# Patient Record
Sex: FEMALE | Race: WHITE | NOT HISPANIC OR LATINO | Employment: OTHER | URBAN - METROPOLITAN AREA
[De-identification: names, ages, dates, MRNs, and addresses within clinical notes are randomized per-mention and may not be internally consistent; named-entity substitution may affect disease eponyms.]

---

## 2017-03-01 ENCOUNTER — HOSPITAL ENCOUNTER (OUTPATIENT)
Dept: PAIN MEDICINE | Facility: HOSPITAL | Age: 77
Discharge: HOME | End: 2017-03-01
Attending: PHYSICAL MEDICINE & REHABILITATION | Admitting: PHYSICAL MEDICINE & REHABILITATION

## 2017-04-19 ENCOUNTER — HOSPITAL ENCOUNTER (OUTPATIENT)
Dept: PAIN MEDICINE | Facility: HOSPITAL | Age: 77
Discharge: HOME | End: 2017-04-19
Attending: PHYSICAL MEDICINE & REHABILITATION | Admitting: PHYSICAL MEDICINE & REHABILITATION

## 2017-06-21 ENCOUNTER — HOSPITAL ENCOUNTER (OUTPATIENT)
Dept: PAIN MEDICINE | Facility: HOSPITAL | Age: 77
Discharge: HOME | End: 2017-06-21
Attending: PHYSICAL MEDICINE & REHABILITATION | Admitting: PHYSICAL MEDICINE & REHABILITATION

## 2017-06-27 ENCOUNTER — HOSPITAL ENCOUNTER (OUTPATIENT)
Dept: NUCLEAR MEDICINE | Facility: HOSPITAL | Age: 77
Discharge: HOME | End: 2017-06-27
Attending: INTERNAL MEDICINE | Admitting: INTERNAL MEDICINE

## 2017-08-16 ENCOUNTER — HOSPITAL ENCOUNTER (OUTPATIENT)
Dept: PAIN MEDICINE | Facility: HOSPITAL | Age: 77
Discharge: HOME | End: 2017-08-16
Attending: PHYSICAL MEDICINE & REHABILITATION | Admitting: PHYSICAL MEDICINE & REHABILITATION

## 2017-10-18 ENCOUNTER — HOSPITAL ENCOUNTER (OUTPATIENT)
Dept: PAIN MEDICINE | Facility: HOSPITAL | Age: 77
Discharge: HOME | End: 2017-10-18
Attending: PHYSICAL MEDICINE & REHABILITATION | Admitting: PHYSICAL MEDICINE & REHABILITATION

## 2017-12-20 ENCOUNTER — HOSPITAL ENCOUNTER (OUTPATIENT)
Dept: PAIN MEDICINE | Facility: HOSPITAL | Age: 77
Discharge: HOME | End: 2017-12-20
Attending: PHYSICAL MEDICINE & REHABILITATION | Admitting: PHYSICAL MEDICINE & REHABILITATION

## 2017-12-20 ENCOUNTER — HOSPITAL ENCOUNTER (OUTPATIENT)
Dept: OTHER | Facility: HOSPITAL | Age: 77
Discharge: HOME | End: 2017-12-20
Attending: PHYSICAL MEDICINE & REHABILITATION | Admitting: PHYSICAL MEDICINE & REHABILITATION

## 2018-01-02 ENCOUNTER — OFFICE (AMBULATORY)
Dept: URBAN - METROPOLITAN AREA CLINIC 64 | Facility: CLINIC | Age: 78
End: 2018-01-02

## 2018-01-02 VITALS
SYSTOLIC BLOOD PRESSURE: 156 MMHG | DIASTOLIC BLOOD PRESSURE: 80 MMHG | WEIGHT: 193 LBS | HEIGHT: 64 IN | HEART RATE: 54 BPM

## 2018-01-02 DIAGNOSIS — R13.10 DYSPHAGIA, UNSPECIFIED: ICD-10-CM

## 2018-01-02 DIAGNOSIS — K59.00 CONSTIPATION, UNSPECIFIED: ICD-10-CM

## 2018-01-02 LAB
CBC WITH DIFFERENTIAL/PLATELET: BASO (ABSOLUTE): 0 X10E3/UL (ref 0–0.2)
CBC WITH DIFFERENTIAL/PLATELET: BASOS: 0 %
CBC WITH DIFFERENTIAL/PLATELET: EOS (ABSOLUTE): 0.1 X10E3/UL (ref 0–0.4)
CBC WITH DIFFERENTIAL/PLATELET: EOS: 2 %
CBC WITH DIFFERENTIAL/PLATELET: HEMATOCRIT: 44 % (ref 34–46.6)
CBC WITH DIFFERENTIAL/PLATELET: HEMATOLOGY COMMENTS: (no result)
CBC WITH DIFFERENTIAL/PLATELET: HEMOGLOBIN: 14.9 G/DL (ref 11.1–15.9)
CBC WITH DIFFERENTIAL/PLATELET: IMMATURE CELLS: (no result)
CBC WITH DIFFERENTIAL/PLATELET: IMMATURE GRANS (ABS): 0 X10E3/UL (ref 0–0.1)
CBC WITH DIFFERENTIAL/PLATELET: IMMATURE GRANULOCYTES: 0 %
CBC WITH DIFFERENTIAL/PLATELET: LYMPHS (ABSOLUTE): 2.2 X10E3/UL (ref 0.7–3.1)
CBC WITH DIFFERENTIAL/PLATELET: LYMPHS: 34 %
CBC WITH DIFFERENTIAL/PLATELET: MCH: 32.2 PG (ref 26.6–33)
CBC WITH DIFFERENTIAL/PLATELET: MCHC: 33.9 G/DL (ref 31.5–35.7)
CBC WITH DIFFERENTIAL/PLATELET: MCV: 95 FL (ref 79–97)
CBC WITH DIFFERENTIAL/PLATELET: MONOCYTES(ABSOLUTE): 0.4 X10E3/UL (ref 0.1–0.9)
CBC WITH DIFFERENTIAL/PLATELET: MONOCYTES: 7 %
CBC WITH DIFFERENTIAL/PLATELET: NEUTROPHILS (ABSOLUTE): 3.6 X10E3/UL (ref 1.4–7)
CBC WITH DIFFERENTIAL/PLATELET: NEUTROPHILS: 57 %
CBC WITH DIFFERENTIAL/PLATELET: NRBC: (no result)
CBC WITH DIFFERENTIAL/PLATELET: PLATELETS: 253 X10E3/UL (ref 150–379)
CBC WITH DIFFERENTIAL/PLATELET: RBC: 4.63 X10E6/UL (ref 3.77–5.28)
CBC WITH DIFFERENTIAL/PLATELET: RDW: 13.2 % (ref 12.3–15.4)
CBC WITH DIFFERENTIAL/PLATELET: WBC: 6.4 X10E3/UL (ref 3.4–10.8)

## 2018-01-02 PROCEDURE — 99213 OFFICE O/P EST LOW 20 MIN: CPT | Performed by: INTERNAL MEDICINE

## 2018-01-30 ENCOUNTER — ON CAMPUS - OUTPATIENT (AMBULATORY)
Dept: URBAN - METROPOLITAN AREA HOSPITAL 2 | Facility: HOSPITAL | Age: 78
End: 2018-01-30

## 2018-01-30 VITALS
SYSTOLIC BLOOD PRESSURE: 158 MMHG | OXYGEN SATURATION: 98 % | SYSTOLIC BLOOD PRESSURE: 134 MMHG | DIASTOLIC BLOOD PRESSURE: 74 MMHG | HEIGHT: 64 IN | HEART RATE: 73 BPM | SYSTOLIC BLOOD PRESSURE: 147 MMHG | HEART RATE: 68 BPM | HEART RATE: 62 BPM | OXYGEN SATURATION: 94 % | DIASTOLIC BLOOD PRESSURE: 71 MMHG | HEART RATE: 67 BPM | DIASTOLIC BLOOD PRESSURE: 96 MMHG | OXYGEN SATURATION: 95 % | RESPIRATION RATE: 16 BRPM | SYSTOLIC BLOOD PRESSURE: 153 MMHG | RESPIRATION RATE: 18 BRPM | DIASTOLIC BLOOD PRESSURE: 76 MMHG | TEMPERATURE: 97.8 F | RESPIRATION RATE: 15 BRPM | OXYGEN SATURATION: 99 % | DIASTOLIC BLOOD PRESSURE: 87 MMHG | SYSTOLIC BLOOD PRESSURE: 143 MMHG | WEIGHT: 175 LBS

## 2018-01-30 DIAGNOSIS — R13.10 DYSPHAGIA, UNSPECIFIED: ICD-10-CM

## 2018-01-30 DIAGNOSIS — K31.89 OTHER DISEASES OF STOMACH AND DUODENUM: ICD-10-CM

## 2018-01-30 DIAGNOSIS — K29.70 GASTRITIS, UNSPECIFIED, WITHOUT BLEEDING: ICD-10-CM

## 2018-01-30 DIAGNOSIS — K22.2 ESOPHAGEAL OBSTRUCTION: ICD-10-CM

## 2018-01-30 DIAGNOSIS — K44.9 DIAPHRAGMATIC HERNIA WITHOUT OBSTRUCTION OR GANGRENE: ICD-10-CM

## 2018-01-30 PROCEDURE — 43248 EGD GUIDE WIRE INSERTION: CPT | Performed by: INTERNAL MEDICINE

## 2018-01-30 RX ADMIN — PROPOFOL: 10 INJECTION, EMULSION INTRAVENOUS at 10:10

## 2018-03-02 ENCOUNTER — HOSPITAL ENCOUNTER (OUTPATIENT)
Dept: PAIN MEDICINE | Facility: HOSPITAL | Age: 78
Discharge: HOME | End: 2018-03-02
Attending: PHYSICAL MEDICINE & REHABILITATION | Admitting: PHYSICAL MEDICINE & REHABILITATION

## 2018-05-11 ENCOUNTER — HOSPITAL ENCOUNTER (OUTPATIENT)
Dept: PAIN MEDICINE | Facility: HOSPITAL | Age: 78
Discharge: HOME | End: 2018-05-11
Attending: PHYSICAL MEDICINE & REHABILITATION | Admitting: PHYSICAL MEDICINE & REHABILITATION

## 2018-07-06 ENCOUNTER — HOSPITAL ENCOUNTER (OUTPATIENT)
Dept: PAIN MEDICINE | Facility: HOSPITAL | Age: 78
Discharge: HOME | End: 2018-07-06
Attending: PHYSICAL MEDICINE & REHABILITATION | Admitting: PHYSICAL MEDICINE & REHABILITATION

## 2018-09-14 ENCOUNTER — HOSPITAL ENCOUNTER (OUTPATIENT)
Dept: PAIN MEDICINE | Facility: HOSPITAL | Age: 78
Discharge: HOME | End: 2018-09-14
Attending: PHYSICAL MEDICINE & REHABILITATION | Admitting: PHYSICAL MEDICINE & REHABILITATION

## 2018-11-14 ENCOUNTER — HOSPITAL ENCOUNTER (OUTPATIENT)
Dept: PAIN MEDICINE | Facility: HOSPITAL | Age: 78
Discharge: HOME | End: 2018-11-14
Attending: PHYSICAL MEDICINE & REHABILITATION | Admitting: PHYSICAL MEDICINE & REHABILITATION

## 2019-01-07 ENCOUNTER — OFFICE (AMBULATORY)
Dept: URBAN - METROPOLITAN AREA CLINIC 64 | Facility: CLINIC | Age: 79
End: 2019-01-07

## 2019-01-07 VITALS
DIASTOLIC BLOOD PRESSURE: 77 MMHG | SYSTOLIC BLOOD PRESSURE: 142 MMHG | WEIGHT: 186 LBS | HEART RATE: 69 BPM | HEIGHT: 64 IN

## 2019-01-07 DIAGNOSIS — K21.9 GASTRO-ESOPHAGEAL REFLUX DISEASE WITHOUT ESOPHAGITIS: ICD-10-CM

## 2019-01-07 DIAGNOSIS — K59.00 CONSTIPATION, UNSPECIFIED: ICD-10-CM

## 2019-01-07 DIAGNOSIS — R13.10 DYSPHAGIA, UNSPECIFIED: ICD-10-CM

## 2019-01-07 DIAGNOSIS — R10.30 LOWER ABDOMINAL PAIN, UNSPECIFIED: ICD-10-CM

## 2019-01-07 PROCEDURE — 99213 OFFICE O/P EST LOW 20 MIN: CPT | Performed by: INTERNAL MEDICINE

## 2019-01-07 RX ORDER — POLYETHYLENE GLYCOL 3350 17 G/17G
POWDER, FOR SOLUTION ORAL
Qty: 1 | Refills: 11 | Status: COMPLETED
Start: 2019-01-07 | End: 2021-07-06

## 2019-02-06 ENCOUNTER — HOSPITAL ENCOUNTER (OUTPATIENT)
Dept: PAIN MEDICINE | Facility: HOSPITAL | Age: 79
Discharge: HOME | End: 2019-02-06
Attending: PHYSICAL MEDICINE & REHABILITATION | Admitting: PHYSICAL MEDICINE & REHABILITATION

## 2019-05-08 ENCOUNTER — HOSPITAL ENCOUNTER (OUTPATIENT)
Dept: PAIN MEDICINE | Facility: HOSPITAL | Age: 79
Discharge: HOME | End: 2019-05-08
Attending: PHYSICAL MEDICINE & REHABILITATION | Admitting: PHYSICAL MEDICINE & REHABILITATION

## 2019-07-29 RX ORDER — GABAPENTIN 400 MG/1
CAPSULE ORAL
Qty: 90 CAPSULE | Refills: 2 | OUTPATIENT
Start: 2019-07-29

## 2019-07-31 ENCOUNTER — TELEPHONE (OUTPATIENT)
Dept: PAIN MEDICINE | Facility: CLINIC | Age: 79
End: 2019-07-31

## 2019-07-31 RX ORDER — GABAPENTIN 400 MG/1
CAPSULE ORAL
Qty: 90 CAPSULE | Refills: 2 | OUTPATIENT
Start: 2019-07-31

## 2019-07-31 NOTE — TELEPHONE ENCOUNTER
Can't, Gabapentin will increase her falls risk even further, especially with the somnolence. Thanks.

## 2019-07-31 NOTE — TELEPHONE ENCOUNTER
Pt called left voice message , wants refill on Gabapentin at [facility], pt call back # is [phone]--pt is seen at [facility]

## 2019-08-06 RX ORDER — POTASSIUM CHLORIDE 20 MEQ/1
20 TABLET, EXTENDED RELEASE ORAL 2 TIMES DAILY
COMMUNITY
Start: 2013-08-20

## 2019-08-06 RX ORDER — OMEPRAZOLE 20 MG/1
20 CAPSULE, DELAYED RELEASE ORAL 2 TIMES DAILY
COMMUNITY
Start: 2015-04-01 | End: 2022-03-07

## 2019-08-06 RX ORDER — LOSARTAN POTASSIUM 100 MG/1
100 TABLET ORAL DAILY
COMMUNITY
Start: 2017-04-19

## 2019-08-06 RX ORDER — ATORVASTATIN CALCIUM 40 MG/1
40 TABLET, FILM COATED ORAL DAILY
COMMUNITY
Start: 2014-08-27

## 2019-08-06 RX ORDER — FENTANYL 25 UG/H
PATCH TRANSDERMAL
COMMUNITY
Start: 2015-04-17 | End: 2019-08-07 | Stop reason: SDUPTHER

## 2019-08-06 RX ORDER — ESTRADIOL 0.5 MG/1
0.5 TABLET ORAL DAILY
COMMUNITY
Start: 2014-02-19 | End: 2022-03-07

## 2019-08-06 RX ORDER — ASCORBIC ACID
CRYSTALS ORAL
COMMUNITY
Start: 2015-06-29 | End: 2022-03-07

## 2019-08-06 RX ORDER — OXYBUTYNIN CHLORIDE 5 MG/1
5 TABLET ORAL 2 TIMES DAILY
COMMUNITY
Start: 2014-02-19 | End: 2021-06-16

## 2019-08-06 RX ORDER — VENLAFAXINE HYDROCHLORIDE 150 MG/1
150 TABLET, EXTENDED RELEASE ORAL DAILY
COMMUNITY
Start: 2014-05-13

## 2019-08-06 RX ORDER — LEVOTHYROXINE SODIUM 0.07 MG/1
75 TABLET ORAL DAILY
COMMUNITY
Start: 2013-05-23

## 2019-08-06 RX ORDER — HYDROCODONE BITARTRATE AND ACETAMINOPHEN 10; 325 MG/1; MG/1
TABLET ORAL
COMMUNITY
Start: 2015-12-02 | End: 2019-08-07 | Stop reason: SDUPTHER

## 2019-08-06 RX ORDER — FUROSEMIDE 40 MG/1
40 TABLET ORAL EVERY 24 HOURS
COMMUNITY
Start: 2018-07-06

## 2019-08-06 RX ORDER — GABAPENTIN 400 MG/1
400 CAPSULE ORAL 3 TIMES DAILY
COMMUNITY
Start: 2017-11-15 | End: 2019-11-06

## 2019-08-06 RX ORDER — CHLORTHALIDONE 25 MG/1
25 TABLET ORAL
COMMUNITY
Start: 2014-02-19 | End: 2022-03-07

## 2019-08-06 RX ORDER — METOCLOPRAMIDE 5 MG/1
5 TABLET ORAL 2 TIMES DAILY
COMMUNITY
Start: 2017-03-01 | End: 2020-02-24

## 2019-08-07 ENCOUNTER — OFFICE VISIT (OUTPATIENT)
Dept: PAIN MEDICINE | Facility: CLINIC | Age: 79
End: 2019-08-07

## 2019-08-07 VITALS
HEIGHT: 65 IN | TEMPERATURE: 99.3 F | RESPIRATION RATE: 16 BRPM | DIASTOLIC BLOOD PRESSURE: 87 MMHG | HEART RATE: 58 BPM | OXYGEN SATURATION: 93 % | WEIGHT: 184 LBS | BODY MASS INDEX: 30.66 KG/M2 | SYSTOLIC BLOOD PRESSURE: 148 MMHG

## 2019-08-07 DIAGNOSIS — G89.29 CHRONIC PAIN OF BOTH KNEES: Primary | ICD-10-CM

## 2019-08-07 DIAGNOSIS — M51.37 DEGENERATION OF LUMBAR OR LUMBOSACRAL INTERVERTEBRAL DISC: ICD-10-CM

## 2019-08-07 DIAGNOSIS — Z79.899 OTHER LONG TERM (CURRENT) DRUG THERAPY: ICD-10-CM

## 2019-08-07 DIAGNOSIS — M25.562 CHRONIC PAIN OF BOTH KNEES: Primary | ICD-10-CM

## 2019-08-07 DIAGNOSIS — M25.511 CHRONIC PAIN OF BOTH SHOULDERS: ICD-10-CM

## 2019-08-07 DIAGNOSIS — M47.27 OSTEOARTHRITIS OF SPINE WITH RADICULOPATHY, LUMBOSACRAL REGION: ICD-10-CM

## 2019-08-07 DIAGNOSIS — M25.572 CHRONIC PAIN OF BOTH ANKLES: ICD-10-CM

## 2019-08-07 DIAGNOSIS — G89.29 CHRONIC PAIN OF BOTH SHOULDERS: ICD-10-CM

## 2019-08-07 DIAGNOSIS — M25.551 HIP PAIN, RIGHT: ICD-10-CM

## 2019-08-07 DIAGNOSIS — M25.50 PAIN IN JOINT INVOLVING MULTIPLE SITES: ICD-10-CM

## 2019-08-07 DIAGNOSIS — M48.04 SPINAL STENOSIS OF THORACIC REGION: ICD-10-CM

## 2019-08-07 DIAGNOSIS — M50.30 OTHER CERVICAL DISC DEGENERATION, UNSPECIFIED CERVICAL REGION: ICD-10-CM

## 2019-08-07 DIAGNOSIS — M96.1 POSTLAMINECTOMY SYNDROME, LUMBAR REGION: ICD-10-CM

## 2019-08-07 DIAGNOSIS — M25.561 CHRONIC PAIN OF BOTH KNEES: Primary | ICD-10-CM

## 2019-08-07 DIAGNOSIS — G89.29 CHRONIC BILATERAL LOW BACK PAIN WITH RIGHT-SIDED SCIATICA: ICD-10-CM

## 2019-08-07 DIAGNOSIS — M48.061 SPINAL STENOSIS OF LUMBAR REGION, UNSPECIFIED WHETHER NEUROGENIC CLAUDICATION PRESENT: ICD-10-CM

## 2019-08-07 DIAGNOSIS — M54.41 CHRONIC BILATERAL LOW BACK PAIN WITH RIGHT-SIDED SCIATICA: ICD-10-CM

## 2019-08-07 DIAGNOSIS — M48.02 SPINAL STENOSIS OF CERVICAL REGION: ICD-10-CM

## 2019-08-07 DIAGNOSIS — M25.571 CHRONIC PAIN OF BOTH ANKLES: ICD-10-CM

## 2019-08-07 DIAGNOSIS — M25.512 CHRONIC PAIN OF BOTH SHOULDERS: ICD-10-CM

## 2019-08-07 DIAGNOSIS — M54.16 LUMBAR RADICULOPATHY: ICD-10-CM

## 2019-08-07 DIAGNOSIS — G89.29 CHRONIC PAIN OF BOTH ANKLES: ICD-10-CM

## 2019-08-07 DIAGNOSIS — M51.34 OTHER INTERVERTEBRAL DISC DEGENERATION, THORACIC REGION: ICD-10-CM

## 2019-08-07 PROCEDURE — 99213 OFFICE O/P EST LOW 20 MIN: CPT | Performed by: PHYSICAL MEDICINE & REHABILITATION

## 2019-08-07 PROCEDURE — G0463 HOSPITAL OUTPT CLINIC VISIT: HCPCS | Performed by: PHYSICAL MEDICINE & REHABILITATION

## 2019-08-07 RX ORDER — HYDROCODONE BITARTRATE AND ACETAMINOPHEN 10; 325 MG/1; MG/1
1 TABLET ORAL 2 TIMES DAILY PRN
Qty: 60 TABLET | Refills: 0 | Status: SHIPPED | OUTPATIENT
Start: 2019-08-07 | End: 2019-08-07 | Stop reason: SDUPTHER

## 2019-08-07 RX ORDER — FENTANYL 25 UG/H
1 PATCH TRANSDERMAL
Qty: 10 PATCH | Refills: 0 | Status: SHIPPED | OUTPATIENT
Start: 2019-08-07 | End: 2019-08-07 | Stop reason: SDUPTHER

## 2019-08-07 RX ORDER — FENTANYL 25 UG/H
1 PATCH TRANSDERMAL
Qty: 10 PATCH | Refills: 0 | Status: SHIPPED | OUTPATIENT
Start: 2019-08-07 | End: 2019-11-06 | Stop reason: SDUPTHER

## 2019-08-07 RX ORDER — FERROUS SULFATE 325(65) MG
325 TABLET ORAL
COMMUNITY
End: 2022-03-07

## 2019-08-07 RX ORDER — HYDROCODONE BITARTRATE AND ACETAMINOPHEN 10; 325 MG/1; MG/1
1 TABLET ORAL 2 TIMES DAILY PRN
Qty: 60 TABLET | Refills: 0 | Status: SHIPPED | OUTPATIENT
Start: 2019-08-07 | End: 2019-11-06 | Stop reason: SDUPTHER

## 2019-08-07 NOTE — PROGRESS NOTES
Subjective   Anamika Oliver is a 79 y.o. female.     All over pain. Worst pain is LBP, began in 1974 with fall, has numerous falls from right foot drop, pain is aching, sharp, stinging, stabbing, nonradiating, worse with all activities, ambulation, housework especially mopping and sweeping, improves with rest and laying down, improves with meds. Pain prevents housework, ambulation for any distance (uses RW), lower extremity dressing, meds help with all these.  Started Tramadol 50mg with almost no benefit, increased gabapentin to 800mg TID with improvement and improvement in mood, had taken Lortab 10/325 qdaily in the past with more benefit, started Norco 5/325 qdaily then BID, then QID, in past tried Fentanyl patch with good benefit. Was prescribed Butrans patch but couldn't afford it. Tried PT for 3-4 months several times with not much benefit, though aquatherapy helped. Tried LESIs without much help. S/p lumbar surgery x 2, no further surgery planned. Has right foot drop with numbness beginning at knee in all distributions, uses AFO, no numbness in LLE, no saddle anesthesia, no b/b incontinence. Also pain in b/l shoulders with exacerbation of right shoulder pain, left elbow, left wrist, right hip, RLE. Extensive pathology on MRI L-spine, some also on MRI T-spine. Norco 10/325mg helped pain, worked better at QID dosing but still a little inadequate. Switched to Duragesic 25mcg/hr q3d, felt was inadequate,  tried using 2 and didn't like how it made her feel, too strong, but has breakthrough pain most days. Added Norco 10/325mg qdaily prn, then BID, formerly adequte, not currently. Did not get Voltaren. Pain inhibiting daily activities a little more. Symptoms essentially unchanged since last visit except worsening R shoulder pain, responded instantly to cream. Falling, with headaches, no LOC, new R hip pain. May be moving into NH in several months.         The following portions of the patient's history were  reviewed and updated as appropriate: allergies, current medications, past family history, past medical history, past social history, past surgical history and problem list.    Review of Systems   Constitutional: Positive for fatigue. Negative for chills and fever.   HENT: Negative for hearing loss and trouble swallowing.    Eyes: Negative for visual disturbance.   Respiratory: Positive for shortness of breath.    Cardiovascular: Positive for chest pain.   Gastrointestinal: Negative for abdominal pain, constipation, diarrhea, nausea and vomiting.   Genitourinary: Negative for urinary incontinence.   Musculoskeletal: Positive for arthralgias, back pain, joint swelling and neck pain. Negative for myalgias.   Neurological: Positive for weakness. Negative for dizziness, numbness and headache.       Objective   Physical Exam   Constitutional: She is oriented to person, place, and time. She appears well-developed and well-nourished.   HENT:   Head: Normocephalic and atraumatic.   Eyes: EOM are normal. Pupils are equal, round, and reactive to light.   Neck: Normal range of motion.   Cardiovascular: Normal rate, regular rhythm, normal heart sounds and intact distal pulses.   Pulmonary/Chest: Breath sounds normal.   Abdominal: Soft. Bowel sounds are normal. She exhibits no distension. There is no tenderness.   Musculoskeletal:   R foot drop   Neurological: She is alert and oriented to person, place, and time. She has normal strength and normal reflexes. She displays normal reflexes. No sensory deficit.   Psychiatric: She has a normal mood and affect. Her behavior is normal. Thought content normal.         Assessment/Plan   Anamika was seen today for back pain and leg pain.    Diagnoses and all orders for this visit:    Chronic pain of both knees    Chronic bilateral low back pain with right-sided sciatica    Hip pain, right    Chronic pain of both shoulders    Lumbar radiculopathy    Chronic pain of both ankles    Pain in joint  involving multiple sites    Degeneration of lumbar or lumbosacral intervertebral disc    Other cervical disc degeneration, unspecified cervical region    Other intervertebral disc degeneration, thoracic region    Osteoarthritis of spine with radiculopathy, lumbosacral region    Other long term (current) drug therapy    Postlaminectomy syndrome, lumbar region    Spinal stenosis of thoracic region    Spinal stenosis of cervical region    Spinal stenosis of lumbar region, unspecified whether neurogenic claudication present        INspect reviewed, in order. Low risk. UDS 9/14/18 in order. Repeat next visit.  Cont Duragesic 25mcg/hr q3d, Norco 10/325mg q12h prn breakthrough pain. O2 99%, no SOA.   Cont Voltaren gel for hand and shoulder.  No Gabapentin with falls risk and somnolence.  Cont other meds as prescribed.  X-ray R hip to r/o new pathology with no acute issues, has mod OA and GTB.  Strongly encouraged patient to use rolling walker whenever up, use seat when holding still, begin aquatherapy if possible, already has HH nurse coming by. Discussed having  PT, she will consider it.  Stop RxAlt #1 cream, helped but had a reaction. Will just use the Voltaren.  RTC 3 months for f/u.

## 2019-11-06 ENCOUNTER — OFFICE VISIT (OUTPATIENT)
Dept: PAIN MEDICINE | Facility: CLINIC | Age: 79
End: 2019-11-06

## 2019-11-06 ENCOUNTER — RESULTS ENCOUNTER (OUTPATIENT)
Dept: PAIN MEDICINE | Facility: CLINIC | Age: 79
End: 2019-11-06

## 2019-11-06 VITALS
OXYGEN SATURATION: 100 % | DIASTOLIC BLOOD PRESSURE: 95 MMHG | HEART RATE: 78 BPM | TEMPERATURE: 98.2 F | RESPIRATION RATE: 16 BRPM | SYSTOLIC BLOOD PRESSURE: 166 MMHG

## 2019-11-06 DIAGNOSIS — M25.572 CHRONIC PAIN OF BOTH ANKLES: ICD-10-CM

## 2019-11-06 DIAGNOSIS — M51.34 OTHER INTERVERTEBRAL DISC DEGENERATION, THORACIC REGION: ICD-10-CM

## 2019-11-06 DIAGNOSIS — M54.41 CHRONIC BILATERAL LOW BACK PAIN WITH RIGHT-SIDED SCIATICA: Primary | ICD-10-CM

## 2019-11-06 DIAGNOSIS — M25.561 CHRONIC PAIN OF BOTH KNEES: ICD-10-CM

## 2019-11-06 DIAGNOSIS — M48.061 SPINAL STENOSIS OF LUMBAR REGION, UNSPECIFIED WHETHER NEUROGENIC CLAUDICATION PRESENT: ICD-10-CM

## 2019-11-06 DIAGNOSIS — Z79.899 OTHER LONG TERM (CURRENT) DRUG THERAPY: ICD-10-CM

## 2019-11-06 DIAGNOSIS — M51.37 DEGENERATION OF LUMBAR OR LUMBOSACRAL INTERVERTEBRAL DISC: ICD-10-CM

## 2019-11-06 DIAGNOSIS — M25.50 PAIN IN JOINT INVOLVING MULTIPLE SITES: ICD-10-CM

## 2019-11-06 DIAGNOSIS — M25.551 HIP PAIN, RIGHT: ICD-10-CM

## 2019-11-06 DIAGNOSIS — M47.27 OSTEOARTHRITIS OF SPINE WITH RADICULOPATHY, LUMBOSACRAL REGION: ICD-10-CM

## 2019-11-06 DIAGNOSIS — G89.29 CHRONIC BILATERAL LOW BACK PAIN WITH RIGHT-SIDED SCIATICA: Primary | ICD-10-CM

## 2019-11-06 DIAGNOSIS — M25.562 CHRONIC PAIN OF BOTH KNEES: ICD-10-CM

## 2019-11-06 DIAGNOSIS — M48.02 SPINAL STENOSIS OF CERVICAL REGION: ICD-10-CM

## 2019-11-06 DIAGNOSIS — G89.29 CHRONIC PAIN OF BOTH KNEES: ICD-10-CM

## 2019-11-06 DIAGNOSIS — M54.16 LUMBAR RADICULOPATHY: ICD-10-CM

## 2019-11-06 DIAGNOSIS — M48.04 SPINAL STENOSIS OF THORACIC REGION: ICD-10-CM

## 2019-11-06 DIAGNOSIS — G89.29 CHRONIC PAIN OF BOTH ANKLES: ICD-10-CM

## 2019-11-06 DIAGNOSIS — M96.1 POSTLAMINECTOMY SYNDROME, LUMBAR REGION: ICD-10-CM

## 2019-11-06 DIAGNOSIS — M50.30 OTHER CERVICAL DISC DEGENERATION, UNSPECIFIED CERVICAL REGION: ICD-10-CM

## 2019-11-06 DIAGNOSIS — M25.511 CHRONIC PAIN OF BOTH SHOULDERS: ICD-10-CM

## 2019-11-06 DIAGNOSIS — G89.29 CHRONIC PAIN OF BOTH SHOULDERS: ICD-10-CM

## 2019-11-06 DIAGNOSIS — M25.512 CHRONIC PAIN OF BOTH SHOULDERS: ICD-10-CM

## 2019-11-06 DIAGNOSIS — M25.571 CHRONIC PAIN OF BOTH ANKLES: ICD-10-CM

## 2019-11-06 PROCEDURE — G0463 HOSPITAL OUTPT CLINIC VISIT: HCPCS | Performed by: PHYSICAL MEDICINE & REHABILITATION

## 2019-11-06 PROCEDURE — 99213 OFFICE O/P EST LOW 20 MIN: CPT | Performed by: PHYSICAL MEDICINE & REHABILITATION

## 2019-11-06 RX ORDER — HYDROCODONE BITARTRATE AND ACETAMINOPHEN 10; 325 MG/1; MG/1
1 TABLET ORAL 2 TIMES DAILY PRN
Qty: 60 TABLET | Refills: 0 | Status: SHIPPED | OUTPATIENT
Start: 2019-11-06 | End: 2019-11-06 | Stop reason: SDUPTHER

## 2019-11-06 RX ORDER — FENTANYL 25 UG/H
1 PATCH TRANSDERMAL
Qty: 10 PATCH | Refills: 0 | Status: SHIPPED | OUTPATIENT
Start: 2019-11-06 | End: 2019-11-06 | Stop reason: SDUPTHER

## 2019-11-06 RX ORDER — FENTANYL 25 UG/H
1 PATCH TRANSDERMAL
Qty: 10 PATCH | Refills: 0 | Status: SHIPPED | OUTPATIENT
Start: 2019-11-06 | End: 2020-02-24 | Stop reason: SDUPTHER

## 2019-11-06 RX ORDER — HYDROCODONE BITARTRATE AND ACETAMINOPHEN 10; 325 MG/1; MG/1
1 TABLET ORAL 2 TIMES DAILY PRN
Qty: 60 TABLET | Refills: 0 | Status: SHIPPED | OUTPATIENT
Start: 2019-11-06 | End: 2020-02-24 | Stop reason: SDUPTHER

## 2019-11-06 NOTE — PROGRESS NOTES
Subjective   Anamika Oliver is a 79 y.o. female.     All over pain. Worst pain is LBP, began in 1974 with fall, has numerous falls from right foot drop, pain is aching, sharp, stinging, stabbing, nonradiating, worse with all activities, ambulation, housework especially mopping and sweeping, improves with rest and laying down, improves with meds. Pain prevents housework, ambulation for any distance (uses RW), lower extremity dressing, meds help with all these.  Started Tramadol 50mg with almost no benefit, increased gabapentin to 800mg TID with improvement and improvement in mood, had taken Lortab 10/325 qdaily in the past with more benefit, started Norco 5/325 qdaily then BID, then QID, in past tried Fentanyl patch with good benefit. Was prescribed Butrans patch but couldn't afford it. Tried PT for 3-4 months several times with not much benefit, though aquatherapy helped. Tried LESIs without much help. S/p lumbar surgery x 2, no further surgery planned. Has right foot drop with numbness beginning at knee in all distributions, uses AFO, no numbness in LLE, no saddle anesthesia, no b/b incontinence. Also pain in b/l shoulders with exacerbation of right shoulder pain, left elbow, left wrist, right hip, RLE. Extensive pathology on MRI L-spine, some also on MRI T-spine. Norco 10/325mg helped pain, worked better at QID dosing but still a little inadequate. Switched to Duragesic 25mcg/hr q3d, felt was inadequate,  tried using 2 and didn't like how it made her feel, too strong, but has breakthrough pain most days. Added Norco 10/325mg qdaily prn, then BID, formerly adequte, not currently. Did not get Voltaren. Pain inhibiting daily activities a little more. Symptoms essentially unchanged since last visit except worsening R shoulder pain, responded instantly to cream. Falling, with headaches, no LOC, new R hip pain. May be moving into NH in several months.         The following portions of the patient's history were  reviewed and updated as appropriate: allergies, current medications, past family history, past medical history, past social history, past surgical history and problem list.    Review of Systems   Constitutional: Positive for fatigue. Negative for chills and fever.   HENT: Negative for hearing loss and trouble swallowing.    Eyes: Negative for visual disturbance.   Respiratory: Positive for shortness of breath.    Cardiovascular: Positive for chest pain.   Gastrointestinal: Negative for abdominal pain, constipation, diarrhea, nausea and vomiting.   Genitourinary: Negative for urinary incontinence.   Musculoskeletal: Positive for arthralgias, back pain, joint swelling and neck pain. Negative for myalgias.   Neurological: Positive for weakness. Negative for dizziness, numbness and headache.       Objective   Physical Exam   Constitutional: She is oriented to person, place, and time. She appears well-developed and well-nourished.   HENT:   Head: Normocephalic and atraumatic.   Eyes: EOM are normal. Pupils are equal, round, and reactive to light.   Neck: Normal range of motion.   Cardiovascular: Normal rate, regular rhythm, normal heart sounds and intact distal pulses.   Pulmonary/Chest: Breath sounds normal.   Abdominal: Soft. Bowel sounds are normal. She exhibits no distension. There is no tenderness.   Musculoskeletal:   R foot drop   Neurological: She is alert and oriented to person, place, and time. She has normal strength and normal reflexes. She displays normal reflexes. No sensory deficit.   Psychiatric: She has a normal mood and affect. Her behavior is normal. Thought content normal.         Assessment/Plan   Anamika was seen today for leg pain and tailbone pain.    Diagnoses and all orders for this visit:    Chronic bilateral low back pain with right-sided sciatica    Hip pain, right    Chronic pain of both shoulders    Lumbar radiculopathy    Chronic pain of both ankles    Pain in joint involving multiple  sites    Chronic pain of both knees    Degeneration of lumbar or lumbosacral intervertebral disc    Other cervical disc degeneration, unspecified cervical region    Other intervertebral disc degeneration, thoracic region    Osteoarthritis of spine with radiculopathy, lumbosacral region    Other long term (current) drug therapy    Postlaminectomy syndrome, lumbar region    Spinal stenosis of thoracic region    Spinal stenosis of cervical region    Spinal stenosis of lumbar region, unspecified whether neurogenic claudication present        INspect reviewed, in order. Low risk. UDS 9/14/18 in order. Repeat next visit.  Cont Duragesic 25mcg/hr q3d, Norco 10/325mg q12h prn breakthrough pain. O2 99%, no SOA.   Cont Voltaren gel for hand and shoulder.  No Gabapentin with falls risk and somnolence.  Cont other meds as prescribed.  X-ray R hip to r/o new pathology in 2017 with no acute issues, has mod OA and GTB.  Strongly encouraged patient to use rolling walker whenever up, use seat when holding still, begin aquatherapy if possible, already has  nurse coming by. Discussed having  PT, she will consider it.  Stopped RxAlt #1 cream, helped but had a reaction. Will just use the Voltaren.  RTC 3 months for f/u.

## 2020-02-24 ENCOUNTER — OFFICE VISIT (OUTPATIENT)
Dept: PAIN MEDICINE | Facility: CLINIC | Age: 80
End: 2020-02-24

## 2020-02-24 VITALS
DIASTOLIC BLOOD PRESSURE: 84 MMHG | SYSTOLIC BLOOD PRESSURE: 160 MMHG | RESPIRATION RATE: 16 BRPM | HEART RATE: 64 BPM | TEMPERATURE: 98.4 F | OXYGEN SATURATION: 95 %

## 2020-02-24 DIAGNOSIS — M47.27 OSTEOARTHRITIS OF SPINE WITH RADICULOPATHY, LUMBOSACRAL REGION: ICD-10-CM

## 2020-02-24 DIAGNOSIS — Z79.899 OTHER LONG TERM (CURRENT) DRUG THERAPY: ICD-10-CM

## 2020-02-24 DIAGNOSIS — M48.04 SPINAL STENOSIS OF THORACIC REGION: ICD-10-CM

## 2020-02-24 DIAGNOSIS — M25.50 PAIN IN JOINT INVOLVING MULTIPLE SITES: ICD-10-CM

## 2020-02-24 DIAGNOSIS — G89.29 CHRONIC BILATERAL LOW BACK PAIN WITH RIGHT-SIDED SCIATICA: Primary | ICD-10-CM

## 2020-02-24 DIAGNOSIS — M25.572 CHRONIC PAIN OF BOTH ANKLES: ICD-10-CM

## 2020-02-24 DIAGNOSIS — M54.16 LUMBAR RADICULOPATHY: ICD-10-CM

## 2020-02-24 DIAGNOSIS — G89.29 CHRONIC PAIN OF BOTH KNEES: ICD-10-CM

## 2020-02-24 DIAGNOSIS — M51.37 DEGENERATION OF LUMBAR OR LUMBOSACRAL INTERVERTEBRAL DISC: ICD-10-CM

## 2020-02-24 DIAGNOSIS — M48.061 SPINAL STENOSIS OF LUMBAR REGION, UNSPECIFIED WHETHER NEUROGENIC CLAUDICATION PRESENT: ICD-10-CM

## 2020-02-24 DIAGNOSIS — M25.551 HIP PAIN, RIGHT: ICD-10-CM

## 2020-02-24 DIAGNOSIS — M54.41 CHRONIC BILATERAL LOW BACK PAIN WITH RIGHT-SIDED SCIATICA: Primary | ICD-10-CM

## 2020-02-24 DIAGNOSIS — M50.30 OTHER CERVICAL DISC DEGENERATION, UNSPECIFIED CERVICAL REGION: ICD-10-CM

## 2020-02-24 DIAGNOSIS — M25.562 CHRONIC PAIN OF BOTH KNEES: ICD-10-CM

## 2020-02-24 DIAGNOSIS — G89.29 CHRONIC PAIN OF BOTH ANKLES: ICD-10-CM

## 2020-02-24 DIAGNOSIS — M48.02 SPINAL STENOSIS OF CERVICAL REGION: ICD-10-CM

## 2020-02-24 DIAGNOSIS — M25.571 CHRONIC PAIN OF BOTH ANKLES: ICD-10-CM

## 2020-02-24 DIAGNOSIS — M96.1 POSTLAMINECTOMY SYNDROME, LUMBAR REGION: ICD-10-CM

## 2020-02-24 DIAGNOSIS — M25.561 CHRONIC PAIN OF BOTH KNEES: ICD-10-CM

## 2020-02-24 PROCEDURE — 99213 OFFICE O/P EST LOW 20 MIN: CPT | Performed by: PHYSICAL MEDICINE & REHABILITATION

## 2020-02-24 PROCEDURE — G0463 HOSPITAL OUTPT CLINIC VISIT: HCPCS | Performed by: PHYSICAL MEDICINE & REHABILITATION

## 2020-02-24 RX ORDER — HYDROCODONE BITARTRATE AND ACETAMINOPHEN 10; 325 MG/1; MG/1
1 TABLET ORAL 2 TIMES DAILY PRN
Qty: 60 TABLET | Refills: 0 | Status: SHIPPED | OUTPATIENT
Start: 2020-02-24 | End: 2020-02-24 | Stop reason: SDUPTHER

## 2020-02-24 RX ORDER — HYDROCODONE BITARTRATE AND ACETAMINOPHEN 10; 325 MG/1; MG/1
1 TABLET ORAL 2 TIMES DAILY PRN
Qty: 60 TABLET | Refills: 0 | Status: SHIPPED | OUTPATIENT
Start: 2020-02-24 | End: 2020-06-15 | Stop reason: SDUPTHER

## 2020-02-24 RX ORDER — FENTANYL 25 UG/H
1 PATCH TRANSDERMAL
Qty: 10 PATCH | Refills: 0 | Status: SHIPPED | OUTPATIENT
Start: 2020-02-24 | End: 2020-02-24 | Stop reason: SDUPTHER

## 2020-02-24 RX ORDER — OLANZAPINE 5 MG/1
5 TABLET ORAL
COMMUNITY
Start: 2020-02-06 | End: 2022-03-07

## 2020-02-24 RX ORDER — FENTANYL 25 UG/H
1 PATCH TRANSDERMAL
Qty: 10 PATCH | Refills: 0 | Status: SHIPPED | OUTPATIENT
Start: 2020-02-24 | End: 2020-06-15 | Stop reason: SDUPTHER

## 2020-02-24 NOTE — PROGRESS NOTES
Subjective   Anamika Oliver is a 80 y.o. female.     All over pain. Worst pain is LBP, began in 1974 with fall, has numerous falls from right foot drop, pain is aching, sharp, stinging, stabbing, nonradiating, worse with all activities, ambulation, housework especially mopping and sweeping, improves with rest and laying down, improves with meds. Pain prevents housework, ambulation for any distance (uses RW), lower extremity dressing, meds help with all these.  Started Tramadol 50mg with almost no benefit, increased gabapentin to 800mg TID with improvement and improvement in mood, had taken Lortab 10/325 qdaily in the past with more benefit, started Norco 5/325 qdaily then BID, then QID, in past tried Fentanyl patch with good benefit. Was prescribed Butrans patch but couldn't afford it. Tried PT for 3-4 months several times with not much benefit, though aquatherapy helped. Tried LESIs without much help. S/p lumbar surgery x 2, no further surgery planned. Has right foot drop with numbness beginning at knee in all distributions, uses AFO, no numbness in LLE, no saddle anesthesia, no b/b incontinence. Also pain in b/l shoulders with exacerbation of right shoulder pain, left elbow, left wrist, right hip, RLE. Extensive pathology on MRI L-spine, some also on MRI T-spine. Norco 10/325mg helped pain, worked better at QID dosing but still a little inadequate. Switched to Duragesic 25mcg/hr q3d, felt was inadequate,  tried using 2 and didn't like how it made her feel, too strong, but has breakthrough pain most days. Added Norco 10/325mg qdaily prn, then BID, formerly adequte, not currently. Did not get Voltaren. Pain inhibiting daily activities a little more. Symptoms essentially unchanged since last visit except worsening R shoulder pain, responded instantly to cream. Falling, with headaches, no LOC, new R hip pain, inpt with visual and auditory hallucinations, current meds continued, short refill on [place] by Psych. May be  moving into NH shortly.        The following portions of the patient's history were reviewed and updated as appropriate: allergies, current medications, past family history, past medical history, past social history, past surgical history and problem list.    Review of Systems   Constitutional: Positive for fatigue. Negative for chills and fever.   HENT: Negative for hearing loss and trouble swallowing.    Eyes: Negative for visual disturbance.   Respiratory: Positive for shortness of breath.    Cardiovascular: Positive for chest pain.   Gastrointestinal: Negative for abdominal pain, constipation, diarrhea, nausea and vomiting.   Genitourinary: Negative for urinary incontinence.   Musculoskeletal: Positive for arthralgias, back pain, joint swelling and neck pain. Negative for myalgias.   Neurological: Positive for weakness. Negative for dizziness, numbness and headache.       Objective   Physical Exam   Constitutional: She is oriented to person, place, and time. She appears well-developed and well-nourished.   HENT:   Head: Normocephalic and atraumatic.   Eyes: Pupils are equal, round, and reactive to light. EOM are normal.   Neck: Normal range of motion.   Cardiovascular: Normal rate, regular rhythm, normal heart sounds and intact distal pulses.   Pulmonary/Chest: Breath sounds normal.   Abdominal: Soft. Bowel sounds are normal. She exhibits no distension. There is no tenderness.   Musculoskeletal:   R foot drop   Neurological: She is alert and oriented to person, place, and time. She has normal strength and normal reflexes. She displays normal reflexes. No sensory deficit.   Psychiatric: She has a normal mood and affect. Her behavior is normal. Thought content normal.         Assessment/Plan   Anamika was seen today for back pain.    Diagnoses and all orders for this visit:    Chronic bilateral low back pain with right-sided sciatica    Hip pain, right    Lumbar radiculopathy    Chronic pain of both ankles    Pain in  joint involving multiple sites    Degeneration of lumbar or lumbosacral intervertebral disc    Chronic pain of both knees    Other cervical disc degeneration, unspecified cervical region    Osteoarthritis of spine with radiculopathy, lumbosacral region    Other long term (current) drug therapy    Postlaminectomy syndrome, lumbar region    Spinal stenosis of cervical region    Spinal stenosis of thoracic region    Spinal stenosis of lumbar region, unspecified whether neurogenic claudication present        INspect reviewed, in order. Low risk. UDS 11/6/19 in order.   Cont Duragesic 25mcg/hr q3d, Norco 10/325mg q12h prn breakthrough pain. O2 99%, no SOA.   Cont Voltaren gel for hand and shoulder.  No Gabapentin with falls risk and somnolence.  Cont other meds as prescribed.  X-ray R hip to r/o new pathology in 2017 with no acute issues, has mod OA and GTB.  Strongly encouraged patient to use rolling walker whenever up, use seat when holding still, begin aquatherapy if possible, already has HH nurse coming by. Discussed having HH PT, she will consider it.  Stopped RxAlt #1 cream, helped but had a reaction. Will just use the Voltaren.  RTC 3 months for f/u.

## 2020-06-15 ENCOUNTER — OFFICE VISIT (OUTPATIENT)
Dept: PAIN MEDICINE | Facility: CLINIC | Age: 80
End: 2020-06-15

## 2020-06-15 VITALS
OXYGEN SATURATION: 96 % | TEMPERATURE: 97 F | HEIGHT: 64 IN | BODY MASS INDEX: 32.1 KG/M2 | WEIGHT: 188 LBS | DIASTOLIC BLOOD PRESSURE: 79 MMHG | HEART RATE: 63 BPM | RESPIRATION RATE: 16 BRPM | SYSTOLIC BLOOD PRESSURE: 150 MMHG

## 2020-06-15 DIAGNOSIS — M96.1 POSTLAMINECTOMY SYNDROME, LUMBAR REGION: ICD-10-CM

## 2020-06-15 DIAGNOSIS — M48.04 SPINAL STENOSIS OF THORACIC REGION: ICD-10-CM

## 2020-06-15 DIAGNOSIS — Z79.899 OTHER LONG TERM (CURRENT) DRUG THERAPY: ICD-10-CM

## 2020-06-15 DIAGNOSIS — M25.511 CHRONIC PAIN OF BOTH SHOULDERS: ICD-10-CM

## 2020-06-15 DIAGNOSIS — M54.16 LUMBAR RADICULOPATHY: ICD-10-CM

## 2020-06-15 DIAGNOSIS — G89.29 CHRONIC PAIN OF BOTH ANKLES: ICD-10-CM

## 2020-06-15 DIAGNOSIS — M54.41 CHRONIC BILATERAL LOW BACK PAIN WITH RIGHT-SIDED SCIATICA: Primary | ICD-10-CM

## 2020-06-15 DIAGNOSIS — M25.551 HIP PAIN, RIGHT: ICD-10-CM

## 2020-06-15 DIAGNOSIS — M25.50 PAIN IN JOINT INVOLVING MULTIPLE SITES: ICD-10-CM

## 2020-06-15 DIAGNOSIS — G89.29 CHRONIC PAIN OF BOTH KNEES: ICD-10-CM

## 2020-06-15 DIAGNOSIS — M25.572 CHRONIC PAIN OF BOTH ANKLES: ICD-10-CM

## 2020-06-15 DIAGNOSIS — M48.02 SPINAL STENOSIS OF CERVICAL REGION: ICD-10-CM

## 2020-06-15 DIAGNOSIS — M47.27 OSTEOARTHRITIS OF SPINE WITH RADICULOPATHY, LUMBOSACRAL REGION: ICD-10-CM

## 2020-06-15 DIAGNOSIS — M25.512 CHRONIC PAIN OF BOTH SHOULDERS: ICD-10-CM

## 2020-06-15 DIAGNOSIS — M25.562 CHRONIC PAIN OF BOTH KNEES: ICD-10-CM

## 2020-06-15 DIAGNOSIS — M50.30 OTHER CERVICAL DISC DEGENERATION, UNSPECIFIED CERVICAL REGION: ICD-10-CM

## 2020-06-15 DIAGNOSIS — M51.34 OTHER INTERVERTEBRAL DISC DEGENERATION, THORACIC REGION: ICD-10-CM

## 2020-06-15 DIAGNOSIS — M48.061 SPINAL STENOSIS OF LUMBAR REGION, UNSPECIFIED WHETHER NEUROGENIC CLAUDICATION PRESENT: ICD-10-CM

## 2020-06-15 DIAGNOSIS — G89.29 CHRONIC BILATERAL LOW BACK PAIN WITH RIGHT-SIDED SCIATICA: Primary | ICD-10-CM

## 2020-06-15 DIAGNOSIS — M25.561 CHRONIC PAIN OF BOTH KNEES: ICD-10-CM

## 2020-06-15 DIAGNOSIS — M25.571 CHRONIC PAIN OF BOTH ANKLES: ICD-10-CM

## 2020-06-15 DIAGNOSIS — M51.37 DEGENERATION OF LUMBAR OR LUMBOSACRAL INTERVERTEBRAL DISC: ICD-10-CM

## 2020-06-15 DIAGNOSIS — G89.29 CHRONIC PAIN OF BOTH SHOULDERS: ICD-10-CM

## 2020-06-15 PROCEDURE — 99213 OFFICE O/P EST LOW 20 MIN: CPT | Performed by: PHYSICAL MEDICINE & REHABILITATION

## 2020-06-15 PROCEDURE — G0463 HOSPITAL OUTPT CLINIC VISIT: HCPCS | Performed by: PHYSICAL MEDICINE & REHABILITATION

## 2020-06-15 RX ORDER — FENTANYL 25 UG/H
1 PATCH TRANSDERMAL
Qty: 10 PATCH | Refills: 0 | Status: SHIPPED | OUTPATIENT
Start: 2020-06-15 | End: 2020-06-15 | Stop reason: SDUPTHER

## 2020-06-15 RX ORDER — HYDROCODONE BITARTRATE AND ACETAMINOPHEN 10; 325 MG/1; MG/1
1 TABLET ORAL 2 TIMES DAILY PRN
Qty: 60 TABLET | Refills: 0 | Status: SHIPPED | OUTPATIENT
Start: 2020-06-15 | End: 2020-06-15 | Stop reason: SDUPTHER

## 2020-06-15 RX ORDER — FENTANYL 25 UG/H
1 PATCH TRANSDERMAL
Qty: 10 PATCH | Refills: 0 | Status: SHIPPED | OUTPATIENT
Start: 2020-06-15 | End: 2020-09-16 | Stop reason: SDUPTHER

## 2020-06-15 RX ORDER — HYDROCODONE BITARTRATE AND ACETAMINOPHEN 10; 325 MG/1; MG/1
1 TABLET ORAL 2 TIMES DAILY PRN
Qty: 60 TABLET | Refills: 0 | Status: SHIPPED | OUTPATIENT
Start: 2020-06-15 | End: 2020-09-16 | Stop reason: SDUPTHER

## 2020-06-15 NOTE — PROGRESS NOTES
Subjective   Anamika Oliver is a 80 y.o. female.     All over pain. Worst pain is LBP, began in 1974 with fall, has numerous falls from right foot drop, pain is aching, sharp, stinging, stabbing, nonradiating, worse with all activities, ambulation, housework especially mopping and sweeping, improves with rest and laying down, improves with meds. Pain prevents housework, ambulation for any distance (uses RW), lower extremity dressing, meds help with all these.  Started Tramadol 50mg with almost no benefit, increased gabapentin to 800mg TID with improvement and improvement in mood, had taken Lortab 10/325 qdaily in the past with more benefit, started Norco 5/325 qdaily then BID, then QID, in past tried Fentanyl patch with good benefit. Was prescribed Butrans patch but couldn't afford it. Tried PT for 3-4 months several times with not much benefit, though aquatherapy helped. Tried LESIs without much help. S/p lumbar surgery x 2, no further surgery planned. Has right foot drop with numbness beginning at knee in all distributions, uses AFO, no numbness in LLE, no saddle anesthesia, no b/b incontinence. Also pain in b/l shoulders with exacerbation of right shoulder pain, left elbow, left wrist, right hip, RLE. Extensive pathology on MRI L-spine, some also on MRI T-spine. Norco 10/325mg helped pain, worked better at QID dosing but still a little inadequate. Switched to Duragesic 25mcg/hr q3d, felt was inadequate,  tried using 2 and didn't like how it made her feel, too strong, but has breakthrough pain most days. Added Norco 10/325mg qdaily prn, then BID, formerly adequte, not currently. Did not get Voltaren. Pain inhibiting daily activities a little more. Symptoms essentially unchanged since last visit except worsening R shoulder pain, responded instantly to cream. Falling, with headaches, no LOC, new R hip pain, inpt with visual and auditory hallucinations, current meds continued, short refill on [place] by Psych. May be  moving into NH shortly.        The following portions of the patient's history were reviewed and updated as appropriate: allergies, current medications, past family history, past medical history, past social history, past surgical history and problem list.    Review of Systems   Constitutional: Positive for fatigue. Negative for chills and fever.   HENT: Negative for hearing loss and trouble swallowing.    Eyes: Negative for visual disturbance.   Respiratory: Positive for shortness of breath.    Cardiovascular: Positive for chest pain.   Gastrointestinal: Negative for abdominal pain, constipation, diarrhea, nausea and vomiting.   Genitourinary: Negative for urinary incontinence.   Musculoskeletal: Positive for arthralgias, back pain, joint swelling and neck pain. Negative for myalgias.   Neurological: Positive for weakness. Negative for dizziness, numbness and headache.       Objective   Physical Exam   Constitutional: She is oriented to person, place, and time. She appears well-developed and well-nourished.   HENT:   Head: Normocephalic and atraumatic.   Eyes: Pupils are equal, round, and reactive to light. EOM are normal.   Neck: Normal range of motion.   Cardiovascular: Normal rate, regular rhythm, normal heart sounds and intact distal pulses.   Pulmonary/Chest: Breath sounds normal.   Abdominal: Soft. Bowel sounds are normal. She exhibits no distension. There is no tenderness.   Musculoskeletal:   R foot drop   Neurological: She is alert and oriented to person, place, and time. She has normal strength and normal reflexes. She displays normal reflexes. No sensory deficit.   Psychiatric: She has a normal mood and affect. Her behavior is normal. Thought content normal.         Assessment/Plan   Anamika was seen today for hip pain.    Diagnoses and all orders for this visit:    Chronic bilateral low back pain with right-sided sciatica    Hip pain, right    Lumbar radiculopathy    Chronic pain of both ankles    Pain in  joint involving multiple sites    Chronic pain of both shoulders    Chronic pain of both knees    Degeneration of lumbar or lumbosacral intervertebral disc    Other cervical disc degeneration, unspecified cervical region    Other intervertebral disc degeneration, thoracic region    Osteoarthritis of spine with radiculopathy, lumbosacral region    Other long term (current) drug therapy    Postlaminectomy syndrome, lumbar region    Spinal stenosis of cervical region    Spinal stenosis of thoracic region    Spinal stenosis of lumbar region, unspecified whether neurogenic claudication present        INspect reviewed, in order. Low risk. UDS 11/6/19 in order.   Cont Duragesic 25mcg/hr q3d, Norco 10/325mg q12h prn breakthrough pain. O2 96%, no SOA.   Cont Voltaren gel for hand and shoulder.  No Gabapentin with falls risk and somnolence.  Cont other meds as prescribed.  X-ray R hip to r/o new pathology in 2017 with no acute issues, has mod OA and GTB.  Strongly encouraged patient to use rolling walker whenever up, use seat when holding still, begin aquatherapy if possible, already has HH nurse coming by. Discussed having HH PT, she will consider it.  Stopped RxAlt #1 cream, helped but had a reaction. Will just use the Voltaren, works well.  RTC 3 months for f/u.

## 2020-09-16 ENCOUNTER — OFFICE VISIT (OUTPATIENT)
Dept: PAIN MEDICINE | Facility: CLINIC | Age: 80
End: 2020-09-16

## 2020-09-16 ENCOUNTER — RESULTS ENCOUNTER (OUTPATIENT)
Dept: PAIN MEDICINE | Facility: CLINIC | Age: 80
End: 2020-09-16

## 2020-09-16 VITALS
HEIGHT: 64 IN | WEIGHT: 188 LBS | TEMPERATURE: 97.7 F | OXYGEN SATURATION: 96 % | BODY MASS INDEX: 32.1 KG/M2 | DIASTOLIC BLOOD PRESSURE: 78 MMHG | RESPIRATION RATE: 16 BRPM | SYSTOLIC BLOOD PRESSURE: 153 MMHG | HEART RATE: 75 BPM

## 2020-09-16 DIAGNOSIS — M25.572 CHRONIC PAIN OF BOTH ANKLES: ICD-10-CM

## 2020-09-16 DIAGNOSIS — M25.571 CHRONIC PAIN OF BOTH ANKLES: ICD-10-CM

## 2020-09-16 DIAGNOSIS — M51.34 OTHER INTERVERTEBRAL DISC DEGENERATION, THORACIC REGION: ICD-10-CM

## 2020-09-16 DIAGNOSIS — M50.30 OTHER CERVICAL DISC DEGENERATION, UNSPECIFIED CERVICAL REGION: ICD-10-CM

## 2020-09-16 DIAGNOSIS — M48.02 SPINAL STENOSIS OF CERVICAL REGION: ICD-10-CM

## 2020-09-16 DIAGNOSIS — M51.37 DEGENERATION OF LUMBAR OR LUMBOSACRAL INTERVERTEBRAL DISC: ICD-10-CM

## 2020-09-16 DIAGNOSIS — M96.1 POSTLAMINECTOMY SYNDROME, LUMBAR REGION: ICD-10-CM

## 2020-09-16 DIAGNOSIS — M54.41 CHRONIC BILATERAL LOW BACK PAIN WITH RIGHT-SIDED SCIATICA: Primary | ICD-10-CM

## 2020-09-16 DIAGNOSIS — M48.04 SPINAL STENOSIS OF THORACIC REGION: ICD-10-CM

## 2020-09-16 DIAGNOSIS — M25.561 CHRONIC PAIN OF BOTH KNEES: ICD-10-CM

## 2020-09-16 DIAGNOSIS — G89.29 CHRONIC PAIN OF BOTH SHOULDERS: ICD-10-CM

## 2020-09-16 DIAGNOSIS — M25.512 CHRONIC PAIN OF BOTH SHOULDERS: ICD-10-CM

## 2020-09-16 DIAGNOSIS — M54.16 LUMBAR RADICULOPATHY: ICD-10-CM

## 2020-09-16 DIAGNOSIS — G89.29 CHRONIC PAIN OF BOTH KNEES: ICD-10-CM

## 2020-09-16 DIAGNOSIS — G89.29 CHRONIC BILATERAL LOW BACK PAIN WITH RIGHT-SIDED SCIATICA: Primary | ICD-10-CM

## 2020-09-16 DIAGNOSIS — G89.29 CHRONIC PAIN OF BOTH ANKLES: ICD-10-CM

## 2020-09-16 DIAGNOSIS — M47.27 OSTEOARTHRITIS OF SPINE WITH RADICULOPATHY, LUMBOSACRAL REGION: ICD-10-CM

## 2020-09-16 DIAGNOSIS — M25.551 HIP PAIN, RIGHT: ICD-10-CM

## 2020-09-16 DIAGNOSIS — M25.562 CHRONIC PAIN OF BOTH KNEES: ICD-10-CM

## 2020-09-16 DIAGNOSIS — M25.511 CHRONIC PAIN OF BOTH SHOULDERS: ICD-10-CM

## 2020-09-16 DIAGNOSIS — M48.061 SPINAL STENOSIS OF LUMBAR REGION, UNSPECIFIED WHETHER NEUROGENIC CLAUDICATION PRESENT: ICD-10-CM

## 2020-09-16 DIAGNOSIS — Z79.899 OTHER LONG TERM (CURRENT) DRUG THERAPY: ICD-10-CM

## 2020-09-16 DIAGNOSIS — M25.50 PAIN IN JOINT INVOLVING MULTIPLE SITES: ICD-10-CM

## 2020-09-16 PROCEDURE — 99213 OFFICE O/P EST LOW 20 MIN: CPT | Performed by: PHYSICAL MEDICINE & REHABILITATION

## 2020-09-16 PROCEDURE — G0463 HOSPITAL OUTPT CLINIC VISIT: HCPCS | Performed by: PHYSICAL MEDICINE & REHABILITATION

## 2020-09-16 RX ORDER — HYDROCODONE BITARTRATE AND ACETAMINOPHEN 10; 325 MG/1; MG/1
1 TABLET ORAL 2 TIMES DAILY PRN
Qty: 60 TABLET | Refills: 0 | Status: SHIPPED | OUTPATIENT
Start: 2020-09-16 | End: 2020-12-16 | Stop reason: SDUPTHER

## 2020-09-16 RX ORDER — FENTANYL 25 UG/H
1 PATCH TRANSDERMAL
Qty: 10 PATCH | Refills: 0 | Status: SHIPPED | OUTPATIENT
Start: 2020-09-16 | End: 2020-09-16 | Stop reason: SDUPTHER

## 2020-09-16 RX ORDER — HYDROCODONE BITARTRATE AND ACETAMINOPHEN 10; 325 MG/1; MG/1
1 TABLET ORAL 2 TIMES DAILY PRN
Qty: 60 TABLET | Refills: 0 | Status: SHIPPED | OUTPATIENT
Start: 2020-09-16 | End: 2020-09-16 | Stop reason: SDUPTHER

## 2020-09-16 RX ORDER — FENTANYL 25 UG/H
1 PATCH TRANSDERMAL
Qty: 10 PATCH | Refills: 0 | Status: SHIPPED | OUTPATIENT
Start: 2020-09-16 | End: 2020-12-16 | Stop reason: SDUPTHER

## 2020-09-16 NOTE — PROGRESS NOTES
Subjective   Anamika Oliver is a 80 y.o. female.     All over pain. Worst pain is LBP, began in 1974 with fall, has numerous falls from right foot drop, pain is aching, sharp, stinging, stabbing, nonradiating, worse with all activities, ambulation, housework especially mopping and sweeping, improves with rest and laying down, improves with meds. Pain prevents housework, ambulation for any distance (uses RW), lower extremity dressing, meds help with all these.  Started Tramadol 50mg with almost no benefit, increased gabapentin to 800mg TID with improvement and improvement in mood, had taken Lortab 10/325 qdaily in the past with more benefit, started Norco 5/325 qdaily then BID, then QID, in past tried Fentanyl patch with good benefit. Was prescribed Butrans patch but couldn't afford it. Tried PT for 3-4 months several times with not much benefit, though aquatherapy helped. Tried LESIs without much help. S/p lumbar surgery x 2, no further surgery planned. Has right foot drop with numbness beginning at knee in all distributions, uses AFO, no numbness in LLE, no saddle anesthesia, no b/b incontinence. Also pain in b/l shoulders with exacerbation of right shoulder pain, left elbow, left wrist, right hip, RLE. Extensive pathology on MRI L-spine, some also on MRI T-spine. Norco 10/325mg helped pain, worked better at QID dosing but still a little inadequate. Switched to Duragesic 25mcg/hr q3d, felt was inadequate,  tried using 2 and didn't like how it made her feel, too strong, but has breakthrough pain most days. Added Norco 10/325mg qdaily prn, then BID, formerly adequte, not currently. Did not get Voltaren. Pain inhibiting daily activities a little more. Symptoms essentially unchanged since last visit except worsening R shoulder pain, responded instantly to cream. Falling, with headaches, no LOC, new R hip pain, inpt with visual and auditory hallucinations, current meds continued, short refill on [place] by Psych. May be  moving into NH shortly.        The following portions of the patient's history were reviewed and updated as appropriate: allergies, current medications, past family history, past medical history, past social history, past surgical history and problem list.    Review of Systems   Constitutional: Positive for fatigue. Negative for chills and fever.   HENT: Negative for hearing loss and trouble swallowing.    Eyes: Negative for visual disturbance.   Respiratory: Positive for shortness of breath.    Cardiovascular: Positive for chest pain.   Gastrointestinal: Negative for abdominal pain, constipation, diarrhea, nausea and vomiting.   Genitourinary: Negative for urinary incontinence.   Musculoskeletal: Positive for arthralgias, back pain, joint swelling and neck pain. Negative for myalgias.   Neurological: Positive for weakness. Negative for dizziness, numbness and headache.       Objective   Physical Exam   Constitutional: She is oriented to person, place, and time. She appears well-developed and well-nourished.   HENT:   Head: Normocephalic and atraumatic.   Eyes: Pupils are equal, round, and reactive to light.   Neck: Normal range of motion.   Cardiovascular: Normal rate, regular rhythm and normal heart sounds.   Pulmonary/Chest: Breath sounds normal.   Abdominal: Soft. Bowel sounds are normal. She exhibits no distension. There is no abdominal tenderness.   Musculoskeletal:      Comments: R foot drop   Neurological: She is alert and oriented to person, place, and time. She has normal reflexes. She displays normal reflexes. No sensory deficit.   Psychiatric: Her behavior is normal. Thought content normal.         Assessment/Plan   Anamika was seen today for pain.    Diagnoses and all orders for this visit:    Chronic bilateral low back pain with right-sided sciatica    Hip pain, right    Lumbar radiculopathy    Chronic pain of both ankles    Pain in joint involving multiple sites    Chronic pain of both  shoulders    Degeneration of lumbar or lumbosacral intervertebral disc    Chronic pain of both knees    Other cervical disc degeneration, unspecified cervical region    Other intervertebral disc degeneration, thoracic region    Osteoarthritis of spine with radiculopathy, lumbosacral region    Postlaminectomy syndrome, lumbar region    Other long term (current) drug therapy    Spinal stenosis of cervical region    Spinal stenosis of thoracic region    Spinal stenosis of lumbar region, unspecified whether neurogenic claudication present        INspect reviewed, in order. Low risk. UDS 11/6/19 in order.   Cont Duragesic 25mcg/hr q3d, Norco 10/325mg q12h prn breakthrough pain. O2 96%, no SOA. May change once her hallucinations improve. Hallucinations with Oxycodone.  Cont Voltaren gel for hand and shoulder.  No Gabapentin with falls risk and somnolence.  Cont other meds as prescribed.  X-ray R hip to r/o new pathology in 2017 with no acute issues, has mod OA and GTB.  Strongly encouraged patient to use rolling walker whenever up, use seat when holding still, begin aquatherapy if possible, already has HH nurse coming by. Discussed having HH PT, she will consider it.  Stopped RxAlt #1 cream, helped but had a reaction. Will just use the Voltaren, works well.  RTC 3 months for f/u.

## 2020-12-16 ENCOUNTER — OFFICE VISIT (OUTPATIENT)
Dept: PAIN MEDICINE | Facility: CLINIC | Age: 80
End: 2020-12-16

## 2020-12-16 VITALS — WEIGHT: 188 LBS | BODY MASS INDEX: 32.1 KG/M2 | HEIGHT: 64 IN

## 2020-12-16 DIAGNOSIS — M25.551 HIP PAIN, RIGHT: ICD-10-CM

## 2020-12-16 DIAGNOSIS — M25.562 CHRONIC PAIN OF BOTH KNEES: ICD-10-CM

## 2020-12-16 DIAGNOSIS — M48.04 SPINAL STENOSIS OF THORACIC REGION: ICD-10-CM

## 2020-12-16 DIAGNOSIS — M47.27 OSTEOARTHRITIS OF SPINE WITH RADICULOPATHY, LUMBOSACRAL REGION: ICD-10-CM

## 2020-12-16 DIAGNOSIS — Z79.899 OTHER LONG TERM (CURRENT) DRUG THERAPY: ICD-10-CM

## 2020-12-16 DIAGNOSIS — M54.41 CHRONIC BILATERAL LOW BACK PAIN WITH RIGHT-SIDED SCIATICA: Primary | ICD-10-CM

## 2020-12-16 DIAGNOSIS — G89.29 CHRONIC PAIN OF BOTH ANKLES: ICD-10-CM

## 2020-12-16 DIAGNOSIS — M25.511 CHRONIC PAIN OF BOTH SHOULDERS: ICD-10-CM

## 2020-12-16 DIAGNOSIS — M54.16 LUMBAR RADICULOPATHY: ICD-10-CM

## 2020-12-16 DIAGNOSIS — M25.512 CHRONIC PAIN OF BOTH SHOULDERS: ICD-10-CM

## 2020-12-16 DIAGNOSIS — M25.561 CHRONIC PAIN OF BOTH KNEES: ICD-10-CM

## 2020-12-16 DIAGNOSIS — M50.30 OTHER CERVICAL DISC DEGENERATION, UNSPECIFIED CERVICAL REGION: ICD-10-CM

## 2020-12-16 DIAGNOSIS — M25.50 PAIN IN JOINT INVOLVING MULTIPLE SITES: ICD-10-CM

## 2020-12-16 DIAGNOSIS — M51.37 DEGENERATION OF LUMBAR OR LUMBOSACRAL INTERVERTEBRAL DISC: ICD-10-CM

## 2020-12-16 DIAGNOSIS — M48.02 SPINAL STENOSIS OF CERVICAL REGION: ICD-10-CM

## 2020-12-16 DIAGNOSIS — M51.34 OTHER INTERVERTEBRAL DISC DEGENERATION, THORACIC REGION: ICD-10-CM

## 2020-12-16 DIAGNOSIS — M25.571 CHRONIC PAIN OF BOTH ANKLES: ICD-10-CM

## 2020-12-16 DIAGNOSIS — G89.29 CHRONIC BILATERAL LOW BACK PAIN WITH RIGHT-SIDED SCIATICA: Primary | ICD-10-CM

## 2020-12-16 DIAGNOSIS — G89.29 CHRONIC PAIN OF BOTH SHOULDERS: ICD-10-CM

## 2020-12-16 DIAGNOSIS — G89.29 CHRONIC PAIN OF BOTH KNEES: ICD-10-CM

## 2020-12-16 DIAGNOSIS — M48.061 SPINAL STENOSIS OF LUMBAR REGION, UNSPECIFIED WHETHER NEUROGENIC CLAUDICATION PRESENT: ICD-10-CM

## 2020-12-16 DIAGNOSIS — M25.572 CHRONIC PAIN OF BOTH ANKLES: ICD-10-CM

## 2020-12-16 DIAGNOSIS — M96.1 POSTLAMINECTOMY SYNDROME, LUMBAR REGION: ICD-10-CM

## 2020-12-16 PROCEDURE — 99441 PR PHYS/QHP TELEPHONE EVALUATION 5-10 MIN: CPT | Performed by: PHYSICAL MEDICINE & REHABILITATION

## 2020-12-16 RX ORDER — HYDROCODONE BITARTRATE AND ACETAMINOPHEN 10; 325 MG/1; MG/1
1 TABLET ORAL EVERY 8 HOURS PRN
Qty: 90 TABLET | Refills: 0 | Status: SHIPPED | OUTPATIENT
Start: 2020-12-16 | End: 2021-03-10 | Stop reason: SDUPTHER

## 2020-12-16 RX ORDER — FENTANYL 25 UG/H
1 PATCH TRANSDERMAL
Qty: 10 PATCH | Refills: 0 | Status: SHIPPED | OUTPATIENT
Start: 2020-12-16 | End: 2021-03-10 | Stop reason: SDUPTHER

## 2020-12-16 RX ORDER — HYDROCODONE BITARTRATE AND ACETAMINOPHEN 10; 325 MG/1; MG/1
1 TABLET ORAL 3 TIMES DAILY PRN
Qty: 90 TABLET | Refills: 0 | Status: SHIPPED | OUTPATIENT
Start: 2020-12-16 | End: 2021-03-10 | Stop reason: SDUPTHER

## 2020-12-16 NOTE — PROGRESS NOTES
Subjective   Anamika Oliver is a 80 y.o. female.     All over pain. Worst pain is LBP, began in 1974 with fall, has numerous falls from right foot drop, pain is aching, sharp, stinging, stabbing, nonradiating, worse with all activities, ambulation, housework especially mopping and sweeping, improves with rest and laying down, improves with meds. Pain prevents housework, ambulation for any distance (uses RW), lower extremity dressing, meds help with all these.  Started Tramadol 50mg with almost no benefit, increased gabapentin to 800mg TID with improvement and improvement in mood, had taken Lortab 10/325 qdaily in the past with more benefit, started Norco 5/325 qdaily then BID, then QID, in past tried Fentanyl patch with good benefit. Was prescribed Butrans patch but couldn't afford it. Tried PT for 3-4 months several times with not much benefit, though aquatherapy helped. Tried LESIs without much help. S/p lumbar surgery x 2, no further surgery planned. Has right foot drop with numbness beginning at knee in all distributions, uses AFO, no numbness in LLE, no saddle anesthesia, no b/b incontinence. Also pain in b/l shoulders with exacerbation of right shoulder pain, left elbow, left wrist, right hip, RLE. Extensive pathology on MRI L-spine, some also on MRI T-spine. Norco 10/325mg helped pain, worked better at QID dosing but still a little inadequate. Switched to Duragesic 25mcg/hr q3d, felt was inadequate,  tried using 2 and didn't like how it made her feel, too strong, but has breakthrough pain most days. Added Norco 10/325mg qdaily prn, then BID, formerly adequte, not currently. Did not get Voltaren. Pain inhibiting daily activities a little more. Symptoms essentially unchanged since last visit except worsening R shoulder pain, responded instantly to cream. Falling, with headaches, no LOC, new R hip pain, inpt with visual and auditory hallucinations, current meds continued, short refill on [place] by Psych. May be  moving into NH shortly.        The following portions of the patient's history were reviewed and updated as appropriate: allergies, current medications, past family history, past medical history, past social history, past surgical history and problem list.    Review of Systems   Constitutional: Positive for fatigue. Negative for chills and fever.   HENT: Negative for hearing loss and trouble swallowing.    Eyes: Negative for visual disturbance.   Respiratory: Positive for shortness of breath.    Cardiovascular: Positive for chest pain.   Gastrointestinal: Negative for abdominal pain, constipation, diarrhea, nausea and vomiting.   Genitourinary: Negative for urinary incontinence.   Musculoskeletal: Positive for arthralgias, back pain, joint swelling and neck pain. Negative for myalgias.   Neurological: Positive for weakness. Negative for dizziness, numbness and headache.       Objective   Physical Exam   Constitutional: She is oriented to person, place, and time. She appears well-developed and well-nourished.   Musculoskeletal:      Comments: R foot drop   Neurological: She is alert and oriented to person, place, and time. She has normal reflexes.   Psychiatric: Her behavior is normal. Mood, judgment and thought content normal.         Assessment/Plan   Diagnoses and all orders for this visit:    1. Chronic bilateral low back pain with right-sided sciatica (Primary)    2. Lumbar radiculopathy    3. Hip pain, right    4. Pain in joint involving multiple sites    5. Chronic pain of both ankles    6. Chronic pain of both shoulders    7. Degeneration of lumbar or lumbosacral intervertebral disc    8. Chronic pain of both knees    9. Other cervical disc degeneration, unspecified cervical region    10. Other intervertebral disc degeneration, thoracic region    11. Osteoarthritis of spine with radiculopathy, lumbosacral region    12. Other long term (current) drug therapy    13. Postlaminectomy syndrome, lumbar  region    14. Spinal stenosis of cervical region    15. Spinal stenosis of thoracic region    16. Spinal stenosis of lumbar region, unspecified whether neurogenic claudication present        INspect reviewed, in order. Low risk. UDS 9/16/20 in order.   Cont Duragesic 25mcg/hr q3d, increase to Norco 10/325mg q8h prn breakthrough pain. No current SOA. Hallucinations with Oxycodone.  Cont Voltaren gel for hand and shoulder.  No Gabapentin with falls risk and somnolence.  Cont other meds as prescribed.  X-ray R hip to r/o new pathology in 2017 with no acute issues, has mod OA and GTB.  Strongly encouraged patient to use rolling walker whenever up, use seat when holding still, begin aquatherapy if possible, already has HH nurse coming by. Discussed having HH PT, she will consider it.  Stopped RxAlt #1 cream, helped but had a reaction. Will just use the Voltaren, works well.  RTC 3 months for f/u. Telephone visit, spent 6 minutes discussing her plan of care and meds.

## 2021-03-10 ENCOUNTER — OFFICE VISIT (OUTPATIENT)
Dept: PAIN MEDICINE | Facility: CLINIC | Age: 81
End: 2021-03-10

## 2021-03-10 VITALS
DIASTOLIC BLOOD PRESSURE: 86 MMHG | WEIGHT: 188 LBS | BODY MASS INDEX: 32.1 KG/M2 | SYSTOLIC BLOOD PRESSURE: 165 MMHG | HEART RATE: 78 BPM | OXYGEN SATURATION: 95 % | HEIGHT: 64 IN | RESPIRATION RATE: 16 BRPM | TEMPERATURE: 97.5 F

## 2021-03-10 DIAGNOSIS — M54.16 LUMBAR RADICULOPATHY: ICD-10-CM

## 2021-03-10 DIAGNOSIS — G89.29 CHRONIC PAIN OF BOTH ANKLES: ICD-10-CM

## 2021-03-10 DIAGNOSIS — M96.1 POSTLAMINECTOMY SYNDROME, LUMBAR REGION: ICD-10-CM

## 2021-03-10 DIAGNOSIS — M54.2 NECK PAIN: ICD-10-CM

## 2021-03-10 DIAGNOSIS — G89.29 CHRONIC BILATERAL LOW BACK PAIN WITH RIGHT-SIDED SCIATICA: ICD-10-CM

## 2021-03-10 DIAGNOSIS — M50.30 OTHER CERVICAL DISC DEGENERATION, UNSPECIFIED CERVICAL REGION: ICD-10-CM

## 2021-03-10 DIAGNOSIS — G89.29 CHRONIC BILATERAL THORACIC BACK PAIN: ICD-10-CM

## 2021-03-10 DIAGNOSIS — M25.50 PAIN IN JOINT INVOLVING MULTIPLE SITES: ICD-10-CM

## 2021-03-10 DIAGNOSIS — G89.29 CHRONIC PAIN OF BOTH SHOULDERS: ICD-10-CM

## 2021-03-10 DIAGNOSIS — M54.6 CHRONIC BILATERAL THORACIC BACK PAIN: ICD-10-CM

## 2021-03-10 DIAGNOSIS — M25.572 CHRONIC PAIN OF BOTH ANKLES: ICD-10-CM

## 2021-03-10 DIAGNOSIS — M48.061 SPINAL STENOSIS OF LUMBAR REGION, UNSPECIFIED WHETHER NEUROGENIC CLAUDICATION PRESENT: ICD-10-CM

## 2021-03-10 DIAGNOSIS — M48.02 SPINAL STENOSIS OF CERVICAL REGION: ICD-10-CM

## 2021-03-10 DIAGNOSIS — M25.511 CHRONIC PAIN OF BOTH SHOULDERS: ICD-10-CM

## 2021-03-10 DIAGNOSIS — M25.561 CHRONIC PAIN OF BOTH KNEES: ICD-10-CM

## 2021-03-10 DIAGNOSIS — M54.41 CHRONIC BILATERAL LOW BACK PAIN WITH RIGHT-SIDED SCIATICA: ICD-10-CM

## 2021-03-10 DIAGNOSIS — G89.29 CHRONIC PAIN OF BOTH KNEES: ICD-10-CM

## 2021-03-10 DIAGNOSIS — M51.37 DEGENERATION OF LUMBAR OR LUMBOSACRAL INTERVERTEBRAL DISC: ICD-10-CM

## 2021-03-10 DIAGNOSIS — M48.04 SPINAL STENOSIS OF THORACIC REGION: ICD-10-CM

## 2021-03-10 DIAGNOSIS — M51.34 OTHER INTERVERTEBRAL DISC DEGENERATION, THORACIC REGION: ICD-10-CM

## 2021-03-10 DIAGNOSIS — M25.512 CHRONIC PAIN OF BOTH SHOULDERS: ICD-10-CM

## 2021-03-10 DIAGNOSIS — Z79.899 OTHER LONG TERM (CURRENT) DRUG THERAPY: ICD-10-CM

## 2021-03-10 DIAGNOSIS — M47.27 OSTEOARTHRITIS OF SPINE WITH RADICULOPATHY, LUMBOSACRAL REGION: ICD-10-CM

## 2021-03-10 DIAGNOSIS — M25.562 CHRONIC PAIN OF BOTH KNEES: ICD-10-CM

## 2021-03-10 DIAGNOSIS — M25.571 CHRONIC PAIN OF BOTH ANKLES: ICD-10-CM

## 2021-03-10 DIAGNOSIS — M25.551 HIP PAIN, RIGHT: ICD-10-CM

## 2021-03-10 DIAGNOSIS — Z79.899 LONG TERM USE OF DRUG: Primary | ICD-10-CM

## 2021-03-10 PROCEDURE — G0463 HOSPITAL OUTPT CLINIC VISIT: HCPCS | Performed by: PHYSICAL MEDICINE & REHABILITATION

## 2021-03-10 PROCEDURE — 99213 OFFICE O/P EST LOW 20 MIN: CPT | Performed by: PHYSICAL MEDICINE & REHABILITATION

## 2021-03-10 RX ORDER — HYDROCODONE BITARTRATE AND ACETAMINOPHEN 10; 325 MG/1; MG/1
1 TABLET ORAL 3 TIMES DAILY PRN
Qty: 90 TABLET | Refills: 0 | Status: SHIPPED | OUTPATIENT
Start: 2021-03-10 | End: 2021-06-16 | Stop reason: SDUPTHER

## 2021-03-10 RX ORDER — HYDROCODONE BITARTRATE AND ACETAMINOPHEN 10; 325 MG/1; MG/1
1 TABLET ORAL EVERY 8 HOURS PRN
Qty: 90 TABLET | Refills: 0 | Status: SHIPPED | OUTPATIENT
Start: 2021-03-10 | End: 2021-06-16 | Stop reason: SDUPTHER

## 2021-03-10 RX ORDER — FENTANYL 25 UG/H
1 PATCH TRANSDERMAL
Qty: 10 PATCH | Refills: 0 | Status: SHIPPED | OUTPATIENT
Start: 2021-03-10 | End: 2021-06-16 | Stop reason: SDUPTHER

## 2021-03-10 NOTE — PROGRESS NOTES
Subjective   Anamika Oliver is a 81 y.o. female.     All over pain. Worst pain is LBP, began in 1974 with fall, has numerous falls from right foot drop, pain is aching, sharp, stinging, stabbing, nonradiating, worse with all activities, ambulation, housework especially mopping and sweeping, improves with rest and laying down, improves with meds. Pain prevents housework, ambulation for any distance (uses RW), lower extremity dressing, meds help with all these.  Started Tramadol 50mg with almost no benefit, increased gabapentin to 800mg TID with improvement and improvement in mood, had taken Lortab 10/325 qdaily in the past with more benefit, started Norco 5/325 qdaily then BID, then QID, in past tried Fentanyl patch with good benefit. Was prescribed Butrans patch but couldn't afford it. Tried PT for 3-4 months several times with not much benefit, though aquatherapy helped. Tried LESIs without much help. S/p lumbar surgery x 2, no further surgery planned. Has right foot drop with numbness beginning at knee in all distributions, uses AFO, no numbness in LLE, no saddle anesthesia, no b/b incontinence. Also pain in b/l shoulders with exacerbation of right shoulder pain, left elbow, left wrist, right hip, RLE. Extensive pathology on MRI L-spine, some also on MRI T-spine. Norco 10/325mg helped pain, worked better at QID dosing but still a little inadequate. Switched to Duragesic 25mcg/hr q3d, felt was inadequate,  tried using 2 and didn't like how it made her feel, too strong, but has breakthrough pain most days. Added Norco 10/325mg qdaily prn, then BID, formerly adequte, not currently. Did not get Voltaren. Pain inhibiting daily activities a little more. Symptoms essentially unchanged since last visit except worsening R shoulder pain, responded instantly to cream. Falling, with headaches, no LOC, new R hip pain, inpt with visual and auditory hallucinations, current meds continued, short refill on [place] by Psych. May be  moving into NH shortly.        The following portions of the patient's history were reviewed and updated as appropriate: allergies, current medications, past family history, past medical history, past social history, past surgical history and problem list.    Review of Systems   Constitutional: Positive for fatigue. Negative for chills and fever.   HENT: Negative for hearing loss and trouble swallowing.    Eyes: Negative for visual disturbance.   Respiratory: Positive for shortness of breath.    Cardiovascular: Positive for chest pain.   Gastrointestinal: Negative for abdominal pain, constipation, diarrhea, nausea and vomiting.   Genitourinary: Negative for urinary incontinence.   Musculoskeletal: Positive for arthralgias, back pain, joint swelling and neck pain. Negative for myalgias.   Neurological: Positive for weakness. Negative for dizziness, numbness and headache.       Objective   Physical Exam   Constitutional: She is oriented to person, place, and time. She appears well-developed and well-nourished.   HENT:   Head: Normocephalic and atraumatic.   Eyes: Pupils are equal, round, and reactive to light.   Cardiovascular: Normal rate, regular rhythm and normal heart sounds.   Pulmonary/Chest: Breath sounds normal.   Abdominal: Soft. Bowel sounds are normal. She exhibits no distension. There is no abdominal tenderness.   Musculoskeletal:      Comments: R foot drop   Neurological: She is alert and oriented to person, place, and time. She has normal reflexes. She displays normal reflexes. No sensory deficit.   Psychiatric: Her behavior is normal. Thought content normal.         Assessment/Plan   Diagnoses and all orders for this visit:    1. Long term use of drug (Primary)  -     Urine Drug Screen - Urine, Clean Catch; Future    2. Chronic bilateral low back pain with right-sided sciatica    3. Degeneration of lumbar or lumbosacral intervertebral disc    4. Hip pain, right    5. Chronic pain of both knees    6.  Lumbar radiculopathy    7. Other cervical disc degeneration, unspecified cervical region    8. Other intervertebral disc degeneration, thoracic region    9. Other long term (current) drug therapy    10. Chronic pain of both ankles    11. Pain in joint involving multiple sites    12. Chronic pain of both shoulders    13. Postlaminectomy syndrome, lumbar region    14. Spinal stenosis of cervical region    15. Spinal stenosis of thoracic region    16. Spinal stenosis of lumbar region, unspecified whether neurogenic claudication present    17. Osteoarthritis of spine with radiculopathy, lumbosacral region    18. Neck pain    19. Chronic bilateral thoracic back pain        INspect reviewed, in order. Low risk. UDS 9/16/20 in order.   Cont Duragesic 25mcg/hr q3d, Norco 10/325mg q12h prn breakthrough pain. O2 95%, no SOA. Hallucinations with Oxycodone.  Cont Voltaren gel for hand and shoulder.  No Gabapentin with falls risk and somnolence.  Cont other meds as prescribed.  X-ray R hip to r/o new pathology in 2017 with no acute issues, has mod OA and GTB.  Strongly encouraged patient to use rolling walker whenever up, use seat when holding still, begin aquatherapy if possible, already has HH nurse coming by. Discussed having HH PT, she will consider it.  Stopped RxAlt #1 cream, helped but had a reaction. Will just use the Voltaren, works well.  RTC 3 months for f/u.

## 2021-05-11 ENCOUNTER — OFFICE (AMBULATORY)
Dept: URBAN - METROPOLITAN AREA CLINIC 64 | Facility: CLINIC | Age: 81
End: 2021-05-11
Payer: COMMERCIAL

## 2021-05-11 VITALS
HEIGHT: 64 IN | DIASTOLIC BLOOD PRESSURE: 97 MMHG | WEIGHT: 212 LBS | SYSTOLIC BLOOD PRESSURE: 156 MMHG | HEART RATE: 81 BPM

## 2021-05-11 DIAGNOSIS — R13.10 DYSPHAGIA, UNSPECIFIED: ICD-10-CM

## 2021-05-11 DIAGNOSIS — R07.9 CHEST PAIN, UNSPECIFIED: ICD-10-CM

## 2021-05-11 DIAGNOSIS — K59.00 CONSTIPATION, UNSPECIFIED: ICD-10-CM

## 2021-05-11 PROCEDURE — 99213 OFFICE O/P EST LOW 20 MIN: CPT | Performed by: NURSE PRACTITIONER

## 2021-05-11 RX ORDER — POLYETHYLENE GLYCOL 3350 17 G/17G
34 POWDER, FOR SOLUTION ORAL
Qty: 2 | Refills: 11 | Status: ACTIVE
Start: 2021-05-11

## 2021-06-16 ENCOUNTER — OFFICE VISIT (OUTPATIENT)
Dept: PAIN MEDICINE | Facility: CLINIC | Age: 81
End: 2021-06-16

## 2021-06-16 VITALS
BODY MASS INDEX: 32.1 KG/M2 | DIASTOLIC BLOOD PRESSURE: 96 MMHG | RESPIRATION RATE: 16 BRPM | OXYGEN SATURATION: 98 % | WEIGHT: 188 LBS | HEIGHT: 64 IN | SYSTOLIC BLOOD PRESSURE: 167 MMHG | HEART RATE: 70 BPM | TEMPERATURE: 97.3 F

## 2021-06-16 DIAGNOSIS — M25.571 CHRONIC PAIN OF BOTH ANKLES: ICD-10-CM

## 2021-06-16 DIAGNOSIS — M25.511 CHRONIC PAIN OF BOTH SHOULDERS: ICD-10-CM

## 2021-06-16 DIAGNOSIS — M25.50 PAIN IN JOINT INVOLVING MULTIPLE SITES: ICD-10-CM

## 2021-06-16 DIAGNOSIS — M50.30 OTHER CERVICAL DISC DEGENERATION, UNSPECIFIED CERVICAL REGION: ICD-10-CM

## 2021-06-16 DIAGNOSIS — M48.061 SPINAL STENOSIS OF LUMBAR REGION, UNSPECIFIED WHETHER NEUROGENIC CLAUDICATION PRESENT: ICD-10-CM

## 2021-06-16 DIAGNOSIS — M51.34 OTHER INTERVERTEBRAL DISC DEGENERATION, THORACIC REGION: ICD-10-CM

## 2021-06-16 DIAGNOSIS — G89.29 CHRONIC PAIN OF BOTH ANKLES: ICD-10-CM

## 2021-06-16 DIAGNOSIS — M54.6 CHRONIC BILATERAL THORACIC BACK PAIN: ICD-10-CM

## 2021-06-16 DIAGNOSIS — M25.572 CHRONIC PAIN OF BOTH ANKLES: ICD-10-CM

## 2021-06-16 DIAGNOSIS — M54.16 LUMBAR RADICULOPATHY: ICD-10-CM

## 2021-06-16 DIAGNOSIS — G89.29 CHRONIC BILATERAL THORACIC BACK PAIN: ICD-10-CM

## 2021-06-16 DIAGNOSIS — G89.29 CHRONIC BILATERAL LOW BACK PAIN WITH RIGHT-SIDED SCIATICA: Primary | ICD-10-CM

## 2021-06-16 DIAGNOSIS — M48.02 SPINAL STENOSIS OF CERVICAL REGION: ICD-10-CM

## 2021-06-16 DIAGNOSIS — M54.2 NECK PAIN: ICD-10-CM

## 2021-06-16 DIAGNOSIS — M25.551 HIP PAIN, RIGHT: ICD-10-CM

## 2021-06-16 DIAGNOSIS — M96.1 POSTLAMINECTOMY SYNDROME, LUMBAR REGION: ICD-10-CM

## 2021-06-16 DIAGNOSIS — M25.512 CHRONIC PAIN OF BOTH SHOULDERS: ICD-10-CM

## 2021-06-16 DIAGNOSIS — Z79.899 OTHER LONG TERM (CURRENT) DRUG THERAPY: ICD-10-CM

## 2021-06-16 DIAGNOSIS — M47.27 OSTEOARTHRITIS OF SPINE WITH RADICULOPATHY, LUMBOSACRAL REGION: ICD-10-CM

## 2021-06-16 DIAGNOSIS — M54.41 CHRONIC BILATERAL LOW BACK PAIN WITH RIGHT-SIDED SCIATICA: Primary | ICD-10-CM

## 2021-06-16 DIAGNOSIS — G89.29 CHRONIC PAIN OF BOTH SHOULDERS: ICD-10-CM

## 2021-06-16 DIAGNOSIS — M51.37 DEGENERATION OF LUMBAR OR LUMBOSACRAL INTERVERTEBRAL DISC: ICD-10-CM

## 2021-06-16 PROCEDURE — G0463 HOSPITAL OUTPT CLINIC VISIT: HCPCS | Performed by: PHYSICAL MEDICINE & REHABILITATION

## 2021-06-16 PROCEDURE — 99213 OFFICE O/P EST LOW 20 MIN: CPT | Performed by: PHYSICAL MEDICINE & REHABILITATION

## 2021-06-16 RX ORDER — HYDROCODONE BITARTRATE AND ACETAMINOPHEN 10; 325 MG/1; MG/1
1 TABLET ORAL 3 TIMES DAILY PRN
Qty: 90 TABLET | Refills: 0 | Status: SHIPPED | OUTPATIENT
Start: 2021-06-16 | End: 2021-09-08 | Stop reason: SDUPTHER

## 2021-06-16 RX ORDER — HYDROCODONE BITARTRATE AND ACETAMINOPHEN 10; 325 MG/1; MG/1
1 TABLET ORAL EVERY 8 HOURS PRN
Qty: 90 TABLET | Refills: 0 | Status: SHIPPED | OUTPATIENT
Start: 2021-06-16 | End: 2021-09-08 | Stop reason: SDUPTHER

## 2021-06-16 RX ORDER — FENTANYL 25 UG/H
1 PATCH TRANSDERMAL
Qty: 10 PATCH | Refills: 0 | Status: SHIPPED | OUTPATIENT
Start: 2021-06-16 | End: 2021-09-08 | Stop reason: SDUPTHER

## 2021-06-16 NOTE — PROGRESS NOTES
Subjective   Anamika Oliver is a 81 y.o. female.     All over pain. Worst pain is LBP, began in 1974 with fall, has numerous falls from right foot drop, pain is aching, sharp, stinging, stabbing, nonradiating, worse with all activities, ambulation, housework especially mopping and sweeping, improves with rest and laying down, improves with meds. Pain prevents housework, ambulation for any distance (uses RW), lower extremity dressing, meds help with all these.  Started Tramadol 50mg with almost no benefit, increased gabapentin to 800mg TID with improvement and improvement in mood, had taken Lortab 10/325 qdaily in the past with more benefit, started Norco 5/325 qdaily then BID, then QID, in past tried Fentanyl patch with good benefit. Was prescribed Butrans patch but couldn't afford it. Tried PT for 3-4 months several times with not much benefit, though aquatherapy helped. Tried LESIs without much help. S/p lumbar surgery x 2, no further surgery planned. Has right foot drop with numbness beginning at knee in all distributions, uses AFO, no numbness in LLE, no saddle anesthesia, no b/b incontinence. Also pain in b/l shoulders with exacerbation of right shoulder pain, left elbow, left wrist, right hip, RLE. Extensive pathology on MRI L-spine, some also on MRI T-spine. Norco 10/325mg helped pain, worked better at QID dosing but still a little inadequate. Switched to Duragesic 25mcg/hr q3d, felt was inadequate,  tried using 2 and didn't like how it made her feel, too strong, but has breakthrough pain most days. Added Norco 10/325mg qdaily prn, then BID, formerly adequte, not currently. Did not get Voltaren. Pain inhibiting daily activities a little more. Symptoms essentially unchanged since last visit except worsening R shoulder pain, responded instantly to cream. Falling, with headaches, no LOC, new R hip pain, inpt with visual and auditory hallucinations, current meds continued, short refill on [place] by Psych. May be  moving into NH shortly.        The following portions of the patient's history were reviewed and updated as appropriate: allergies, current medications, past family history, past medical history, past social history, past surgical history and problem list.    Review of Systems   Constitutional: Positive for fatigue. Negative for chills and fever.   HENT: Negative for hearing loss and trouble swallowing.    Eyes: Negative for visual disturbance.   Respiratory: Positive for shortness of breath.    Cardiovascular: Positive for chest pain.   Gastrointestinal: Negative for abdominal pain, constipation, diarrhea, nausea and vomiting.   Genitourinary: Negative for urinary incontinence.   Musculoskeletal: Positive for arthralgias, back pain, joint swelling and neck pain. Negative for myalgias.   Neurological: Positive for weakness. Negative for dizziness, numbness and headache.       Objective   Physical Exam   Constitutional: She is oriented to person, place, and time. She appears well-developed and well-nourished.   HENT:   Head: Normocephalic and atraumatic.   Eyes: Pupils are equal, round, and reactive to light.   Cardiovascular: Normal rate, regular rhythm and normal heart sounds.   Pulmonary/Chest: Breath sounds normal.   Abdominal: Soft. Bowel sounds are normal. She exhibits no distension. There is no abdominal tenderness.   Musculoskeletal:      Comments: R foot drop   Neurological: She is alert and oriented to person, place, and time. She has normal reflexes. She displays normal reflexes. No sensory deficit.   Psychiatric: Her behavior is normal. Thought content normal.         Assessment/Plan   Diagnoses and all orders for this visit:    1. Chronic bilateral low back pain with right-sided sciatica (Primary)    2. Neck pain    3. Pain in joint involving multiple sites    4. Chronic pain of both shoulders    5. Postlaminectomy syndrome, lumbar region    6. Spinal stenosis of cervical region    7. Spinal stenosis of  lumbar region, unspecified whether neurogenic claudication present    8. Osteoarthritis of spine with radiculopathy, lumbosacral region    9. Chronic bilateral thoracic back pain    10. Chronic pain of both ankles    11. Other long term (current) drug therapy    12. Other intervertebral disc degeneration, thoracic region    13. Other cervical disc degeneration, unspecified cervical region    14. Lumbar radiculopathy    15. Hip pain, right    16. Degeneration of lumbar or lumbosacral intervertebral disc        INspect reviewed, in order. Low risk. UDS 9/16/20 in order.   Cont Duragesic 25mcg/hr q3d, Norco 10/325mg q12h prn breakthrough pain. O2 98%, no SOA. Hallucinations with Oxycodone.  Cont Voltaren gel for hand and shoulder.  No Gabapentin with falls risk and somnolence.  Cont other meds as prescribed.  X-ray R hip to r/o new pathology in 2017 with no acute issues, has mod OA and GTB.  Strongly encouraged patient to use rolling walker whenever up, use seat when holding still, begin aquatherapy if possible, already has HH nurse coming by. Discussed having HH PT, she will consider it.  Stopped RxAlt #1 cream, helped but had a reaction. Will just use the Voltaren, works well.  RTC 3 months for f/u.

## 2021-07-06 ENCOUNTER — ON CAMPUS - OUTPATIENT (AMBULATORY)
Dept: URBAN - METROPOLITAN AREA HOSPITAL 2 | Facility: HOSPITAL | Age: 81
End: 2021-07-06
Payer: COMMERCIAL

## 2021-07-06 ENCOUNTER — OFFICE (AMBULATORY)
Dept: URBAN - METROPOLITAN AREA PATHOLOGY 4 | Facility: PATHOLOGY | Age: 81
End: 2021-07-06
Payer: COMMERCIAL

## 2021-07-06 VITALS
DIASTOLIC BLOOD PRESSURE: 69 MMHG | OXYGEN SATURATION: 99 % | RESPIRATION RATE: 18 BRPM | HEART RATE: 71 BPM | SYSTOLIC BLOOD PRESSURE: 111 MMHG | DIASTOLIC BLOOD PRESSURE: 81 MMHG | OXYGEN SATURATION: 100 % | HEART RATE: 60 BPM | OXYGEN SATURATION: 97 % | DIASTOLIC BLOOD PRESSURE: 86 MMHG | SYSTOLIC BLOOD PRESSURE: 101 MMHG | HEART RATE: 77 BPM | DIASTOLIC BLOOD PRESSURE: 68 MMHG | SYSTOLIC BLOOD PRESSURE: 159 MMHG | TEMPERATURE: 97.6 F | WEIGHT: 200 LBS | RESPIRATION RATE: 16 BRPM | HEIGHT: 64 IN | HEART RATE: 70 BPM | RESPIRATION RATE: 17 BRPM | OXYGEN SATURATION: 98 % | SYSTOLIC BLOOD PRESSURE: 128 MMHG | DIASTOLIC BLOOD PRESSURE: 80 MMHG | HEART RATE: 66 BPM | SYSTOLIC BLOOD PRESSURE: 145 MMHG | SYSTOLIC BLOOD PRESSURE: 139 MMHG | HEART RATE: 65 BPM

## 2021-07-06 DIAGNOSIS — K31.7 POLYP OF STOMACH AND DUODENUM: ICD-10-CM

## 2021-07-06 DIAGNOSIS — K25.9 GASTRIC ULCER, UNSPECIFIED AS ACUTE OR CHRONIC, WITHOUT HEMO: ICD-10-CM

## 2021-07-06 DIAGNOSIS — K29.00 ACUTE GASTRITIS WITHOUT BLEEDING: ICD-10-CM

## 2021-07-06 DIAGNOSIS — K22.2 ESOPHAGEAL OBSTRUCTION: ICD-10-CM

## 2021-07-06 DIAGNOSIS — R13.10 DYSPHAGIA, UNSPECIFIED: ICD-10-CM

## 2021-07-06 DIAGNOSIS — K44.9 DIAPHRAGMATIC HERNIA WITHOUT OBSTRUCTION OR GANGRENE: ICD-10-CM

## 2021-07-06 LAB
GI HISTOLOGY: A. SELECT: (no result)
GI HISTOLOGY: PDF REPORT: (no result)

## 2021-07-06 PROCEDURE — 43248 EGD GUIDE WIRE INSERTION: CPT | Performed by: INTERNAL MEDICINE

## 2021-07-06 PROCEDURE — 43251 EGD REMOVE LESION SNARE: CPT | Performed by: INTERNAL MEDICINE

## 2021-07-06 PROCEDURE — 88342 IMHCHEM/IMCYTCHM 1ST ANTB: CPT | Mod: 26 | Performed by: INTERNAL MEDICINE

## 2021-07-06 PROCEDURE — 88305 TISSUE EXAM BY PATHOLOGIST: CPT | Mod: 26 | Performed by: INTERNAL MEDICINE

## 2021-07-06 RX ORDER — OMEPRAZOLE 40 MG/1
CAPSULE, DELAYED RELEASE ORAL
Qty: 30 | Refills: 7 | Status: ACTIVE

## 2021-07-06 RX ORDER — SUCRALFATE 1 G/1
TABLET ORAL
Qty: 84 | Refills: 3 | Status: COMPLETED
End: 2022-11-16

## 2021-09-08 ENCOUNTER — OFFICE VISIT (OUTPATIENT)
Dept: PAIN MEDICINE | Facility: CLINIC | Age: 81
End: 2021-09-08

## 2021-09-08 VITALS
HEART RATE: 82 BPM | OXYGEN SATURATION: 97 % | TEMPERATURE: 97.5 F | WEIGHT: 188 LBS | RESPIRATION RATE: 16 BRPM | SYSTOLIC BLOOD PRESSURE: 178 MMHG | HEIGHT: 64 IN | BODY MASS INDEX: 32.1 KG/M2 | DIASTOLIC BLOOD PRESSURE: 86 MMHG

## 2021-09-08 DIAGNOSIS — M48.061 SPINAL STENOSIS OF LUMBAR REGION, UNSPECIFIED WHETHER NEUROGENIC CLAUDICATION PRESENT: ICD-10-CM

## 2021-09-08 DIAGNOSIS — M25.561 CHRONIC PAIN OF BOTH KNEES: ICD-10-CM

## 2021-09-08 DIAGNOSIS — M51.34 OTHER INTERVERTEBRAL DISC DEGENERATION, THORACIC REGION: ICD-10-CM

## 2021-09-08 DIAGNOSIS — M50.30 OTHER CERVICAL DISC DEGENERATION, UNSPECIFIED CERVICAL REGION: ICD-10-CM

## 2021-09-08 DIAGNOSIS — M25.562 CHRONIC PAIN OF BOTH KNEES: ICD-10-CM

## 2021-09-08 DIAGNOSIS — M54.16 LUMBAR RADICULOPATHY: ICD-10-CM

## 2021-09-08 DIAGNOSIS — G89.29 CHRONIC BILATERAL LOW BACK PAIN WITH RIGHT-SIDED SCIATICA: Primary | ICD-10-CM

## 2021-09-08 DIAGNOSIS — M25.511 CHRONIC PAIN OF BOTH SHOULDERS: ICD-10-CM

## 2021-09-08 DIAGNOSIS — M48.04 SPINAL STENOSIS OF THORACIC REGION: ICD-10-CM

## 2021-09-08 DIAGNOSIS — M25.512 CHRONIC PAIN OF BOTH SHOULDERS: ICD-10-CM

## 2021-09-08 DIAGNOSIS — G89.29 CHRONIC PAIN OF BOTH ANKLES: ICD-10-CM

## 2021-09-08 DIAGNOSIS — M48.02 SPINAL STENOSIS OF CERVICAL REGION: ICD-10-CM

## 2021-09-08 DIAGNOSIS — M54.41 CHRONIC BILATERAL LOW BACK PAIN WITH RIGHT-SIDED SCIATICA: Primary | ICD-10-CM

## 2021-09-08 DIAGNOSIS — M25.50 PAIN IN JOINT INVOLVING MULTIPLE SITES: ICD-10-CM

## 2021-09-08 DIAGNOSIS — G89.29 CHRONIC PAIN OF BOTH SHOULDERS: ICD-10-CM

## 2021-09-08 DIAGNOSIS — Z79.899 OTHER LONG TERM (CURRENT) DRUG THERAPY: ICD-10-CM

## 2021-09-08 DIAGNOSIS — M51.37 DEGENERATION OF LUMBAR OR LUMBOSACRAL INTERVERTEBRAL DISC: ICD-10-CM

## 2021-09-08 DIAGNOSIS — M47.27 OSTEOARTHRITIS OF SPINE WITH RADICULOPATHY, LUMBOSACRAL REGION: ICD-10-CM

## 2021-09-08 DIAGNOSIS — M25.571 CHRONIC PAIN OF BOTH ANKLES: ICD-10-CM

## 2021-09-08 DIAGNOSIS — G89.29 CHRONIC BILATERAL THORACIC BACK PAIN: ICD-10-CM

## 2021-09-08 DIAGNOSIS — M25.572 CHRONIC PAIN OF BOTH ANKLES: ICD-10-CM

## 2021-09-08 DIAGNOSIS — M25.551 HIP PAIN, RIGHT: ICD-10-CM

## 2021-09-08 DIAGNOSIS — M54.2 NECK PAIN: ICD-10-CM

## 2021-09-08 DIAGNOSIS — G89.29 CHRONIC PAIN OF BOTH KNEES: ICD-10-CM

## 2021-09-08 DIAGNOSIS — M96.1 POSTLAMINECTOMY SYNDROME, LUMBAR REGION: ICD-10-CM

## 2021-09-08 DIAGNOSIS — M54.6 CHRONIC BILATERAL THORACIC BACK PAIN: ICD-10-CM

## 2021-09-08 PROCEDURE — 99213 OFFICE O/P EST LOW 20 MIN: CPT | Performed by: PHYSICAL MEDICINE & REHABILITATION

## 2021-09-08 PROCEDURE — G0463 HOSPITAL OUTPT CLINIC VISIT: HCPCS | Performed by: PHYSICAL MEDICINE & REHABILITATION

## 2021-09-08 RX ORDER — FENTANYL 25 UG/H
1 PATCH TRANSDERMAL
Qty: 10 PATCH | Refills: 0 | Status: SHIPPED | OUTPATIENT
Start: 2021-09-08 | End: 2021-12-06 | Stop reason: SDUPTHER

## 2021-09-08 RX ORDER — HYDROCODONE BITARTRATE AND ACETAMINOPHEN 10; 325 MG/1; MG/1
1 TABLET ORAL EVERY 8 HOURS PRN
Qty: 90 TABLET | Refills: 0 | Status: SHIPPED | OUTPATIENT
Start: 2021-09-08 | End: 2021-12-06 | Stop reason: SDUPTHER

## 2021-09-08 RX ORDER — HYDROCODONE BITARTRATE AND ACETAMINOPHEN 10; 325 MG/1; MG/1
1 TABLET ORAL 3 TIMES DAILY PRN
Qty: 90 TABLET | Refills: 0 | Status: SHIPPED | OUTPATIENT
Start: 2021-09-08 | End: 2021-12-06 | Stop reason: SDUPTHER

## 2021-09-08 NOTE — PROGRESS NOTES
Subjective   Anamika Oliver is a 81 y.o. female.     All over pain. Worst pain is LBP, began in 1974 with fall, has numerous falls from right foot drop, pain is aching, sharp, stinging, stabbing, nonradiating, worse with all activities, ambulation, housework especially mopping and sweeping, improves with rest and laying down, improves with meds. Pain prevents housework, ambulation for any distance (uses RW), lower extremity dressing, meds help with all these.  Started Tramadol 50mg with almost no benefit, increased gabapentin to 800mg TID with improvement and improvement in mood, had taken Lortab 10/325 qdaily in the past with more benefit, started Norco 5/325 qdaily then BID, then QID, in past tried Fentanyl patch with good benefit. Was prescribed Butrans patch but couldn't afford it. Tried PT for 3-4 months several times with not much benefit, though aquatherapy helped. Tried LESIs without much help. S/p lumbar surgery x 2, no further surgery planned. Has right foot drop with numbness beginning at knee in all distributions, uses AFO, no numbness in LLE, no saddle anesthesia, no b/b incontinence. Also pain in b/l shoulders with exacerbation of right shoulder pain, left elbow, left wrist, right hip, RLE. Extensive pathology on MRI L-spine, some also on MRI T-spine. Norco 10/325mg helped pain, worked better at QID dosing but still a little inadequate. Switched to Duragesic 25mcg/hr q3d, felt was inadequate,  tried using 2 and didn't like how it made her feel, too strong, but has breakthrough pain most days. Added Norco 10/325mg qdaily prn, then BID, formerly adequte, not currently. Did not get Voltaren. Pain inhibiting daily activities a little more. Symptoms essentially unchanged since last visit except worsening R shoulder pain, responded instantly to cream. Falling, with headaches, no LOC, new R hip pain, inpt with visual and auditory hallucinations, current meds continued, short refill on [place] by Psych. May be  moving into NH shortly.        The following portions of the patient's history were reviewed and updated as appropriate: allergies, current medications, past family history, past medical history, past social history, past surgical history and problem list.    Review of Systems   Constitutional: Positive for fatigue. Negative for chills and fever.   HENT: Negative for hearing loss and trouble swallowing.    Eyes: Negative for visual disturbance.   Respiratory: Positive for shortness of breath.    Cardiovascular: Positive for chest pain.   Gastrointestinal: Negative for abdominal pain, constipation, diarrhea, nausea and vomiting.   Genitourinary: Negative for urinary incontinence.   Musculoskeletal: Positive for arthralgias, back pain, joint swelling and neck pain. Negative for myalgias.   Neurological: Positive for weakness. Negative for dizziness, numbness and headache.       Objective   Physical Exam   Constitutional: She is oriented to person, place, and time. She appears well-developed and well-nourished.   HENT:   Head: Normocephalic and atraumatic.   Eyes: Pupils are equal, round, and reactive to light.   Cardiovascular: Normal rate, regular rhythm and normal heart sounds.   Pulmonary/Chest: Breath sounds normal.   Abdominal: Soft. Bowel sounds are normal. She exhibits no distension. There is no abdominal tenderness.   Musculoskeletal:      Comments: R foot drop   Neurological: She is alert and oriented to person, place, and time. She has normal reflexes. She displays normal reflexes. No sensory deficit.   Psychiatric: Her behavior is normal. Thought content normal.         Assessment/Plan   Diagnoses and all orders for this visit:    1. Chronic bilateral low back pain with right-sided sciatica (Primary)    2. Degeneration of lumbar or lumbosacral intervertebral disc    3. Hip pain, right    4. Chronic pain of both knees    5. Lumbar radiculopathy    6. Neck pain    7. Other cervical disc degeneration,  unspecified cervical region    8. Other intervertebral disc degeneration, thoracic region    9. Other long term (current) drug therapy    10. Chronic pain of both ankles    11. Pain in joint involving multiple sites    12. Chronic pain of both shoulders    13. Postlaminectomy syndrome, lumbar region    14. Spinal stenosis of cervical region    15. Spinal stenosis of lumbar region, unspecified whether neurogenic claudication present    16. Spinal stenosis of thoracic region    17. Osteoarthritis of spine with radiculopathy, lumbosacral region    18. Chronic bilateral thoracic back pain        INspect reviewed, in order. Low risk. UDS 6/16/21 in order.   Cont Duragesic 25mcg/hr q3d, Norco 10/325mg q12h prn breakthrough pain. O2 98%, no SOA. Hallucinations with Oxycodone.  Cont Voltaren gel for hand and shoulder.  No Gabapentin with falls risk and somnolence.  Cont other meds as prescribed.  X-ray R hip to r/o new pathology in 2017 with no acute issues, has mod OA and GTB.  Strongly encouraged patient to use rolling walker whenever up, use seat when holding still, begin aquatherapy if possible, already has HH nurse coming by. Discussed having HH PT, she will consider it.  Stopped RxAlt #1 cream, helped but had a reaction. Will just use the Voltaren, works well.  RTC 3 months for f/u.

## 2021-12-06 ENCOUNTER — OFFICE VISIT (OUTPATIENT)
Dept: PAIN MEDICINE | Facility: CLINIC | Age: 81
End: 2021-12-06

## 2021-12-06 VITALS
BODY MASS INDEX: 32.1 KG/M2 | TEMPERATURE: 96.9 F | WEIGHT: 188 LBS | SYSTOLIC BLOOD PRESSURE: 154 MMHG | HEIGHT: 64 IN | OXYGEN SATURATION: 97 % | DIASTOLIC BLOOD PRESSURE: 97 MMHG | RESPIRATION RATE: 20 BRPM | HEART RATE: 76 BPM

## 2021-12-06 DIAGNOSIS — G89.29 CHRONIC BILATERAL THORACIC BACK PAIN: ICD-10-CM

## 2021-12-06 DIAGNOSIS — M25.50 PAIN IN JOINT INVOLVING MULTIPLE SITES: ICD-10-CM

## 2021-12-06 DIAGNOSIS — M48.02 SPINAL STENOSIS OF CERVICAL REGION: ICD-10-CM

## 2021-12-06 DIAGNOSIS — M54.41 CHRONIC BILATERAL LOW BACK PAIN WITH RIGHT-SIDED SCIATICA: Primary | ICD-10-CM

## 2021-12-06 DIAGNOSIS — M54.6 CHRONIC BILATERAL THORACIC BACK PAIN: ICD-10-CM

## 2021-12-06 DIAGNOSIS — M48.061 SPINAL STENOSIS OF LUMBAR REGION, UNSPECIFIED WHETHER NEUROGENIC CLAUDICATION PRESENT: ICD-10-CM

## 2021-12-06 DIAGNOSIS — G89.29 CHRONIC PAIN OF BOTH KNEES: ICD-10-CM

## 2021-12-06 DIAGNOSIS — M50.30 OTHER CERVICAL DISC DEGENERATION, UNSPECIFIED CERVICAL REGION: ICD-10-CM

## 2021-12-06 DIAGNOSIS — G89.29 CHRONIC BILATERAL LOW BACK PAIN WITH RIGHT-SIDED SCIATICA: Primary | ICD-10-CM

## 2021-12-06 DIAGNOSIS — M25.551 HIP PAIN, RIGHT: ICD-10-CM

## 2021-12-06 DIAGNOSIS — M25.561 CHRONIC PAIN OF BOTH KNEES: ICD-10-CM

## 2021-12-06 DIAGNOSIS — M47.27 OSTEOARTHRITIS OF SPINE WITH RADICULOPATHY, LUMBOSACRAL REGION: ICD-10-CM

## 2021-12-06 DIAGNOSIS — M25.562 CHRONIC PAIN OF BOTH KNEES: ICD-10-CM

## 2021-12-06 DIAGNOSIS — M51.34 OTHER INTERVERTEBRAL DISC DEGENERATION, THORACIC REGION: ICD-10-CM

## 2021-12-06 DIAGNOSIS — M96.1 POSTLAMINECTOMY SYNDROME, LUMBAR REGION: ICD-10-CM

## 2021-12-06 DIAGNOSIS — M54.16 LUMBAR RADICULOPATHY: ICD-10-CM

## 2021-12-06 DIAGNOSIS — M54.2 NECK PAIN: ICD-10-CM

## 2021-12-06 PROCEDURE — G0463 HOSPITAL OUTPT CLINIC VISIT: HCPCS | Performed by: PHYSICAL MEDICINE & REHABILITATION

## 2021-12-06 PROCEDURE — 99213 OFFICE O/P EST LOW 20 MIN: CPT | Performed by: PHYSICAL MEDICINE & REHABILITATION

## 2021-12-06 RX ORDER — FENTANYL 25 UG/H
1 PATCH TRANSDERMAL
Qty: 10 PATCH | Refills: 0 | Status: SHIPPED | OUTPATIENT
Start: 2021-12-06 | End: 2022-03-07 | Stop reason: SDUPTHER

## 2021-12-06 RX ORDER — HYDROCODONE BITARTRATE AND ACETAMINOPHEN 10; 325 MG/1; MG/1
TABLET ORAL
Qty: 90 TABLET | Refills: 0 | Status: SHIPPED | OUTPATIENT
Start: 2021-12-06 | End: 2022-03-07 | Stop reason: SDUPTHER

## 2021-12-06 NOTE — PROGRESS NOTES
Subjective   Anamika Oliver is a 81 y.o. female.     All over pain. Worst pain is LBP, began in 1974 with fall, has numerous falls from right foot drop, pain is aching, sharp, stinging, stabbing, nonradiating, worse with all activities, ambulation, housework especially mopping and sweeping, improves with rest and laying down, improves with meds. Pain prevents housework, ambulation for any distance (uses RW), lower extremity dressing, meds help with all these.  Started Tramadol 50mg with almost no benefit, increased gabapentin to 800mg TID with improvement and improvement in mood, had taken Lortab 10/325 qdaily in the past with more benefit, started Norco 5/325 qdaily then BID, then QID, in past tried Fentanyl patch with good benefit. Was prescribed Butrans patch but couldn't afford it. Tried PT for 3-4 months several times with not much benefit, though aquatherapy helped. Tried LESIs without much help. S/p lumbar surgery x 2, no further surgery planned. Has right foot drop with numbness beginning at knee in all distributions, uses AFO, no numbness in LLE, no saddle anesthesia, no b/b incontinence. Also pain in b/l shoulders with exacerbation of right shoulder pain, left elbow, left wrist, right hip, RLE. Extensive pathology on MRI L-spine, some also on MRI T-spine. Norco 10/325mg helped pain, worked better at QID dosing but still a little inadequate. Switched to Duragesic 25mcg/hr q3d, felt was inadequate,  tried using 2 and didn't like how it made her feel, too strong, but has breakthrough pain most days. Added Norco 10/325mg qdaily prn, then BID, formerly adequte, not currently. Did not get Voltaren. Pain inhibiting daily activities a little more. Symptoms essentially unchanged since last visit except worsening R shoulder pain, responded instantly to cream. Falling, with headaches, no LOC, new R hip pain, inpt with visual and auditory hallucinations, current meds continued, short refill on [place] by Psych. May be  moving into NH shortly.        The following portions of the patient's history were reviewed and updated as appropriate: allergies, current medications, past family history, past medical history, past social history, past surgical history and problem list.    Review of Systems   Constitutional: Positive for fatigue. Negative for chills and fever.   HENT: Negative for hearing loss and trouble swallowing.    Eyes: Negative for visual disturbance.   Respiratory: Positive for shortness of breath.    Cardiovascular: Positive for chest pain.   Gastrointestinal: Negative for abdominal pain, constipation, diarrhea, nausea and vomiting.   Genitourinary: Negative for urinary incontinence.   Musculoskeletal: Positive for arthralgias, back pain, joint swelling and neck pain. Negative for myalgias.   Neurological: Positive for weakness. Negative for dizziness, numbness and headache.       Objective   Physical Exam   Constitutional: She is oriented to person, place, and time. She appears well-developed and well-nourished.   HENT:   Head: Normocephalic and atraumatic.   Eyes: Pupils are equal, round, and reactive to light.   Cardiovascular: Normal rate, regular rhythm and normal heart sounds.   Pulmonary/Chest: Breath sounds normal.   Abdominal: Soft. Bowel sounds are normal. She exhibits no distension. There is no abdominal tenderness.   Musculoskeletal:      Comments: R foot drop   Neurological: She is alert and oriented to person, place, and time. She has normal reflexes. She displays normal reflexes. No sensory deficit.   Psychiatric: Her behavior is normal. Thought content normal.         Assessment/Plan   Diagnoses and all orders for this visit:    1. Chronic bilateral low back pain with right-sided sciatica (Primary)    2. Neck pain    3. Hip pain, right    4. Chronic pain of both knees    5. Lumbar radiculopathy    6. Other cervical disc degeneration, unspecified cervical region    7. Other intervertebral disc  degeneration, thoracic region    8. Pain in joint involving multiple sites    9. Postlaminectomy syndrome, lumbar region    10. Spinal stenosis of cervical region    11. Spinal stenosis of lumbar region, unspecified whether neurogenic claudication present    12. Chronic bilateral thoracic back pain    13. Osteoarthritis of spine with radiculopathy, lumbosacral region        INspect reviewed, in order. Low risk. UDS 6/16/21 in order.   Cont Duragesic 25mcg/hr q3d, Norco 10/325mg q12h prn breakthrough pain. O2 97%, no SOA. Hallucinations with Oxycodone.  Cont Voltaren gel for hand and shoulder.  No Gabapentin with falls risk and somnolence.  Cont other meds as prescribed.  X-ray R hip to r/o new pathology in 2017 with no acute issues, has mod OA and GTB.  Strongly encouraged patient to use rolling walker whenever up, use seat when holding still, begin aquatherapy if possible, already has HH nurse coming by. Discussed having HH PT, she will consider it.  Stopped RxAlt #1 cream, helped but had a reaction. Will just use the Voltaren, works well.  RTC 3 months for f/u.

## 2022-03-07 ENCOUNTER — OFFICE VISIT (OUTPATIENT)
Dept: PAIN MEDICINE | Facility: CLINIC | Age: 82
End: 2022-03-07

## 2022-03-07 VITALS
OXYGEN SATURATION: 96 % | HEIGHT: 64 IN | HEART RATE: 76 BPM | RESPIRATION RATE: 16 BRPM | TEMPERATURE: 97.1 F | BODY MASS INDEX: 32.27 KG/M2

## 2022-03-07 DIAGNOSIS — M54.6 CHRONIC BILATERAL THORACIC BACK PAIN: ICD-10-CM

## 2022-03-07 DIAGNOSIS — M96.1 POSTLAMINECTOMY SYNDROME, LUMBAR REGION: ICD-10-CM

## 2022-03-07 DIAGNOSIS — G89.29 CHRONIC PAIN OF BOTH ANKLES: ICD-10-CM

## 2022-03-07 DIAGNOSIS — M50.30 OTHER CERVICAL DISC DEGENERATION, UNSPECIFIED CERVICAL REGION: ICD-10-CM

## 2022-03-07 DIAGNOSIS — G89.29 CHRONIC PAIN OF BOTH KNEES: ICD-10-CM

## 2022-03-07 DIAGNOSIS — G89.29 CHRONIC PAIN OF BOTH SHOULDERS: ICD-10-CM

## 2022-03-07 DIAGNOSIS — M25.561 CHRONIC PAIN OF BOTH KNEES: ICD-10-CM

## 2022-03-07 DIAGNOSIS — G89.29 CHRONIC BILATERAL THORACIC BACK PAIN: ICD-10-CM

## 2022-03-07 DIAGNOSIS — M25.572 CHRONIC PAIN OF BOTH ANKLES: ICD-10-CM

## 2022-03-07 DIAGNOSIS — M54.2 NECK PAIN: ICD-10-CM

## 2022-03-07 DIAGNOSIS — M51.34 OTHER INTERVERTEBRAL DISC DEGENERATION, THORACIC REGION: ICD-10-CM

## 2022-03-07 DIAGNOSIS — M25.551 HIP PAIN, RIGHT: ICD-10-CM

## 2022-03-07 DIAGNOSIS — G89.29 CHRONIC BILATERAL LOW BACK PAIN WITH RIGHT-SIDED SCIATICA: Primary | ICD-10-CM

## 2022-03-07 DIAGNOSIS — Z79.899 OTHER LONG TERM (CURRENT) DRUG THERAPY: ICD-10-CM

## 2022-03-07 DIAGNOSIS — M25.512 CHRONIC PAIN OF BOTH SHOULDERS: ICD-10-CM

## 2022-03-07 DIAGNOSIS — M48.04 SPINAL STENOSIS OF THORACIC REGION: ICD-10-CM

## 2022-03-07 DIAGNOSIS — M48.061 SPINAL STENOSIS OF LUMBAR REGION, UNSPECIFIED WHETHER NEUROGENIC CLAUDICATION PRESENT: ICD-10-CM

## 2022-03-07 DIAGNOSIS — M47.27 OSTEOARTHRITIS OF SPINE WITH RADICULOPATHY, LUMBOSACRAL REGION: ICD-10-CM

## 2022-03-07 DIAGNOSIS — M25.562 CHRONIC PAIN OF BOTH KNEES: ICD-10-CM

## 2022-03-07 DIAGNOSIS — M25.571 CHRONIC PAIN OF BOTH ANKLES: ICD-10-CM

## 2022-03-07 DIAGNOSIS — M51.37 DEGENERATION OF LUMBAR OR LUMBOSACRAL INTERVERTEBRAL DISC: ICD-10-CM

## 2022-03-07 DIAGNOSIS — M25.511 CHRONIC PAIN OF BOTH SHOULDERS: ICD-10-CM

## 2022-03-07 DIAGNOSIS — M48.02 SPINAL STENOSIS OF CERVICAL REGION: ICD-10-CM

## 2022-03-07 DIAGNOSIS — M25.50 PAIN IN JOINT INVOLVING MULTIPLE SITES: ICD-10-CM

## 2022-03-07 DIAGNOSIS — M54.41 CHRONIC BILATERAL LOW BACK PAIN WITH RIGHT-SIDED SCIATICA: Primary | ICD-10-CM

## 2022-03-07 DIAGNOSIS — M54.16 LUMBAR RADICULOPATHY: ICD-10-CM

## 2022-03-07 PROCEDURE — 99213 OFFICE O/P EST LOW 20 MIN: CPT | Performed by: PHYSICAL MEDICINE & REHABILITATION

## 2022-03-07 PROCEDURE — G0463 HOSPITAL OUTPT CLINIC VISIT: HCPCS | Performed by: PHYSICAL MEDICINE & REHABILITATION

## 2022-03-07 RX ORDER — ACETAMINOPHEN 325 MG/1
TABLET ORAL
COMMUNITY
Start: 2022-02-05

## 2022-03-07 RX ORDER — ATORVASTATIN CALCIUM 20 MG/1
TABLET, FILM COATED ORAL
COMMUNITY
Start: 2022-02-21 | End: 2022-03-07

## 2022-03-07 RX ORDER — FENTANYL 25 UG/H
1 PATCH TRANSDERMAL
Qty: 10 PATCH | Refills: 0 | Status: SHIPPED | OUTPATIENT
Start: 2022-03-07 | End: 2022-07-18 | Stop reason: SDUPTHER

## 2022-03-07 RX ORDER — CYANOCOBALAMIN 1000 UG/ML
INJECTION, SOLUTION INTRAMUSCULAR; SUBCUTANEOUS
COMMUNITY
Start: 2022-02-11

## 2022-03-07 RX ORDER — HYDROCODONE BITARTRATE AND ACETAMINOPHEN 10; 325 MG/1; MG/1
TABLET ORAL
Qty: 90 TABLET | Refills: 0 | Status: SHIPPED | OUTPATIENT
Start: 2022-03-07 | End: 2022-07-18 | Stop reason: SDUPTHER

## 2022-03-07 RX ORDER — OMEPRAZOLE 40 MG/1
CAPSULE, DELAYED RELEASE ORAL
COMMUNITY
Start: 2022-02-14

## 2022-03-07 RX ORDER — POLYETHYLENE GLYCOL 3350 17 G/17G
POWDER, FOR SOLUTION ORAL
COMMUNITY
Start: 2022-02-19

## 2022-03-07 RX ORDER — FENTANYL 25 UG/H
1 PATCH TRANSDERMAL
Qty: 10 PATCH | Refills: 0 | Status: SHIPPED | OUTPATIENT
Start: 2022-03-07 | End: 2022-09-16 | Stop reason: SDUPTHER

## 2022-03-07 RX ORDER — HYDROCODONE BITARTRATE AND ACETAMINOPHEN 10; 325 MG/1; MG/1
TABLET ORAL
Qty: 90 TABLET | Refills: 0 | Status: SHIPPED | OUTPATIENT
Start: 2022-03-07 | End: 2022-09-16 | Stop reason: SDUPTHER

## 2022-03-07 RX ORDER — MELATONIN
1000 DAILY
COMMUNITY

## 2022-03-07 RX ORDER — FEXOFENADINE HYDROCHLORIDE 60 MG/1
60 TABLET, FILM COATED ORAL DAILY
COMMUNITY

## 2022-03-07 NOTE — PROGRESS NOTES
Subjective   Anamika Oliver is a 82 y.o. female.     All over pain. Worst pain is LBP, began in 1974 with fall, has numerous falls from right foot drop, pain is aching, sharp, stinging, stabbing, nonradiating, worse with all activities, ambulation, housework especially mopping and sweeping, improves with rest and laying down, improves with meds. Pain prevents housework, ambulation for any distance (uses RW), lower extremity dressing, meds help with all these.  Started Tramadol 50mg with almost no benefit, increased gabapentin to 800mg TID with improvement and improvement in mood, had taken Lortab 10/325 qdaily in the past with more benefit, started Norco 5/325 qdaily then BID, then QID, in past tried Fentanyl patch with good benefit. Was prescribed Butrans patch but couldn't afford it. Tried PT for 3-4 months several times with not much benefit, though aquatherapy helped. Tried LESIs without much help. S/p lumbar surgery x 2, no further surgery planned. Has right foot drop with numbness beginning at knee in all distributions, uses AFO, no numbness in LLE, no saddle anesthesia, no b/b incontinence. Also pain in b/l shoulders with exacerbation of right shoulder pain, left elbow, left wrist, right hip, RLE. Extensive pathology on MRI L-spine, some also on MRI T-spine. Norco 10/325mg helped pain, worked better at QID dosing but still a little inadequate. Switched to Duragesic 25mcg/hr q3d, felt was inadequate,  tried using 2 and didn't like how it made her feel, too strong, but has breakthrough pain most days. Added Norco 10/325mg qdaily prn, then BID, formerly adequte, not currently. Did not get Voltaren. Pain inhibiting daily activities a little more. Symptoms essentially unchanged since last visit except worsening R shoulder pain, responded instantly to cream. Falling, with headaches, no LOC, new R hip pain, inpt with visual and auditory hallucinations, current meds continued, short refill on [place] by Psych. May be  moving into NH shortly.        The following portions of the patient's history were reviewed and updated as appropriate: allergies, current medications, past family history, past medical history, past social history, past surgical history and problem list.    Review of Systems   Constitutional: Positive for fatigue. Negative for chills and fever.   HENT: Negative for hearing loss and trouble swallowing.    Eyes: Negative for visual disturbance.   Respiratory: Positive for shortness of breath.    Cardiovascular: Positive for chest pain.   Gastrointestinal: Negative for abdominal pain, constipation, diarrhea, nausea and vomiting.   Genitourinary: Negative for urinary incontinence.   Musculoskeletal: Positive for arthralgias, back pain, joint swelling and neck pain. Negative for myalgias.   Neurological: Positive for weakness. Negative for dizziness, numbness and headache.       Objective   Physical Exam   Constitutional: She is oriented to person, place, and time. She appears well-developed and well-nourished.   HENT:   Head: Normocephalic and atraumatic.   Eyes: Pupils are equal, round, and reactive to light.   Cardiovascular: Normal rate, regular rhythm and normal heart sounds.   Pulmonary/Chest: Breath sounds normal.   Abdominal: Soft. Bowel sounds are normal. She exhibits no distension. There is no abdominal tenderness.   Musculoskeletal:      Comments: R foot drop   Neurological: She is alert and oriented to person, place, and time. She has normal reflexes. She displays normal reflexes. No sensory deficit.   Psychiatric: Her behavior is normal. Thought content normal.         Assessment/Plan   Diagnoses and all orders for this visit:    1. Chronic bilateral low back pain with right-sided sciatica (Primary)    2. Degeneration of lumbar or lumbosacral intervertebral disc    3. Hip pain, right    4. Chronic pain of both knees    5. Lumbar radiculopathy    6. Neck pain    7. Other cervical disc degeneration,  unspecified cervical region    8. Other intervertebral disc degeneration, thoracic region    9. Other long term (current) drug therapy    10. Chronic pain of both ankles    11. Pain in joint involving multiple sites    12. Chronic pain of both shoulders    13. Postlaminectomy syndrome, lumbar region    14. Spinal stenosis of cervical region    15. Spinal stenosis of thoracic region    16. Spinal stenosis of lumbar region, unspecified whether neurogenic claudication present    17. Osteoarthritis of spine with radiculopathy, lumbosacral region    18. Chronic bilateral thoracic back pain        INspect reviewed, in order. Low risk. UDS 6/16/21 in order.   Cont Duragesic 25mcg/hr q3d, Norco 10/325mg q12h prn breakthrough pain. O2 97%, no SOA. Hallucinations with Oxycodone.  Cont Voltaren gel for hand and shoulder.  No Gabapentin with falls risk and somnolence.  Cont other meds as prescribed.  X-ray R hip to r/o new pathology in 2017 with no acute issues, has mod OA and GTB.  Strongly encouraged patient to use rolling walker whenever up, use seat when holding still, begin aquatherapy if possible, already has HH nurse coming by. Discussed having HH PT, she will consider it.  Stopped RxAlt #1 cream, helped but had a reaction. Will just use the Voltaren, works well.  RTC 3 months for f/u.              Physical Exam  Constitutional:       Appearance: She is well-developed and well-nourished.   HENT:      Head: Normocephalic and atraumatic.   Eyes:      Pupils: Pupils are equal, round, and reactive to light.   Cardiovascular:      Rate and Rhythm: Normal rate and regular rhythm.      Heart sounds: Normal heart sounds.   Pulmonary:      Breath sounds: Normal breath sounds.   Abdominal:      General: Bowel sounds are normal. There is no distension.      Palpations: Abdomen is soft.      Tenderness: There is no abdominal tenderness.   Musculoskeletal:      Comments: R foot drop   Neurological:      Mental Status: She is alert  and oriented to person, place, and time.      Sensory: No sensory deficit.      Deep Tendon Reflexes: Reflexes are normal and symmetric. Reflexes normal.   Psychiatric:         Behavior: Behavior normal.         Thought Content: Thought content normal.

## 2022-07-18 ENCOUNTER — OFFICE VISIT (OUTPATIENT)
Dept: PAIN MEDICINE | Facility: CLINIC | Age: 82
End: 2022-07-18

## 2022-07-18 VITALS
BODY MASS INDEX: 32.1 KG/M2 | RESPIRATION RATE: 16 BRPM | OXYGEN SATURATION: 94 % | SYSTOLIC BLOOD PRESSURE: 167 MMHG | HEART RATE: 77 BPM | HEIGHT: 64 IN | WEIGHT: 188 LBS | DIASTOLIC BLOOD PRESSURE: 72 MMHG

## 2022-07-18 DIAGNOSIS — G89.29 CHRONIC PAIN OF BOTH KNEES: ICD-10-CM

## 2022-07-18 DIAGNOSIS — M25.512 CHRONIC PAIN OF BOTH SHOULDERS: ICD-10-CM

## 2022-07-18 DIAGNOSIS — G89.29 CHRONIC BILATERAL LOW BACK PAIN WITH RIGHT-SIDED SCIATICA: ICD-10-CM

## 2022-07-18 DIAGNOSIS — M48.04 SPINAL STENOSIS OF THORACIC REGION: ICD-10-CM

## 2022-07-18 DIAGNOSIS — M54.6 CHRONIC BILATERAL THORACIC BACK PAIN: ICD-10-CM

## 2022-07-18 DIAGNOSIS — Z79.899 OTHER LONG TERM (CURRENT) DRUG THERAPY: Primary | ICD-10-CM

## 2022-07-18 DIAGNOSIS — M25.511 CHRONIC PAIN OF BOTH SHOULDERS: ICD-10-CM

## 2022-07-18 DIAGNOSIS — M54.16 LUMBAR RADICULOPATHY: ICD-10-CM

## 2022-07-18 DIAGNOSIS — M25.551 HIP PAIN, RIGHT: ICD-10-CM

## 2022-07-18 DIAGNOSIS — M25.50 PAIN IN JOINT INVOLVING MULTIPLE SITES: ICD-10-CM

## 2022-07-18 DIAGNOSIS — M48.061 SPINAL STENOSIS OF LUMBAR REGION, UNSPECIFIED WHETHER NEUROGENIC CLAUDICATION PRESENT: ICD-10-CM

## 2022-07-18 DIAGNOSIS — M48.02 SPINAL STENOSIS OF CERVICAL REGION: ICD-10-CM

## 2022-07-18 DIAGNOSIS — M54.2 NECK PAIN: ICD-10-CM

## 2022-07-18 DIAGNOSIS — G89.29 CHRONIC PAIN OF BOTH ANKLES: ICD-10-CM

## 2022-07-18 DIAGNOSIS — M25.561 CHRONIC PAIN OF BOTH KNEES: ICD-10-CM

## 2022-07-18 DIAGNOSIS — M51.34 OTHER INTERVERTEBRAL DISC DEGENERATION, THORACIC REGION: ICD-10-CM

## 2022-07-18 DIAGNOSIS — M50.30 OTHER CERVICAL DISC DEGENERATION, UNSPECIFIED CERVICAL REGION: ICD-10-CM

## 2022-07-18 DIAGNOSIS — M47.27 OSTEOARTHRITIS OF SPINE WITH RADICULOPATHY, LUMBOSACRAL REGION: ICD-10-CM

## 2022-07-18 DIAGNOSIS — M25.571 CHRONIC PAIN OF BOTH ANKLES: ICD-10-CM

## 2022-07-18 DIAGNOSIS — M25.562 CHRONIC PAIN OF BOTH KNEES: ICD-10-CM

## 2022-07-18 DIAGNOSIS — M25.572 CHRONIC PAIN OF BOTH ANKLES: ICD-10-CM

## 2022-07-18 DIAGNOSIS — M96.1 POSTLAMINECTOMY SYNDROME, LUMBAR REGION: ICD-10-CM

## 2022-07-18 DIAGNOSIS — M54.41 CHRONIC BILATERAL LOW BACK PAIN WITH RIGHT-SIDED SCIATICA: ICD-10-CM

## 2022-07-18 DIAGNOSIS — G89.29 CHRONIC PAIN OF BOTH SHOULDERS: ICD-10-CM

## 2022-07-18 DIAGNOSIS — G89.29 CHRONIC BILATERAL THORACIC BACK PAIN: ICD-10-CM

## 2022-07-18 DIAGNOSIS — M51.37 DEGENERATION OF LUMBAR OR LUMBOSACRAL INTERVERTEBRAL DISC: ICD-10-CM

## 2022-07-18 PROCEDURE — 99213 OFFICE O/P EST LOW 20 MIN: CPT | Performed by: PHYSICAL MEDICINE & REHABILITATION

## 2022-07-18 PROCEDURE — G0463 HOSPITAL OUTPT CLINIC VISIT: HCPCS | Performed by: PHYSICAL MEDICINE & REHABILITATION

## 2022-07-18 RX ORDER — HYDROCODONE BITARTRATE AND ACETAMINOPHEN 10; 325 MG/1; MG/1
TABLET ORAL
Qty: 90 TABLET | Refills: 0 | Status: SHIPPED | OUTPATIENT
Start: 2022-07-18 | End: 2022-10-10 | Stop reason: SDUPTHER

## 2022-07-18 RX ORDER — FENTANYL 25 UG/H
1 PATCH TRANSDERMAL
Qty: 10 PATCH | Refills: 0 | Status: SHIPPED | OUTPATIENT
Start: 2022-07-18 | End: 2022-10-10 | Stop reason: SDUPTHER

## 2022-07-18 NOTE — PROGRESS NOTES
Subjective   Anamika Oliver is a 82 y.o. female.     All over pain. Worst pain is LBP, began in 1974 with fall, has numerous falls from right foot drop, pain is aching, sharp, stinging, stabbing, nonradiating, worse with all activities, ambulation, housework especially mopping and sweeping, improves with rest and laying down, improves with meds. Pain prevents housework, ambulation for any distance (uses RW), lower extremity dressing, meds help with all these.  Started Tramadol 50mg with almost no benefit, increased gabapentin to 800mg TID with improvement and improvement in mood, had taken Lortab 10/325 qdaily in the past with more benefit, started Norco 5/325 qdaily then BID, then QID, in past tried Fentanyl patch with good benefit. Was prescribed Butrans patch but couldn't afford it. Tried PT for 3-4 months several times with not much benefit, though aquatherapy helped. Tried LESIs without much help. S/p lumbar surgery x 2, no further surgery planned. Has right foot drop with numbness beginning at knee in all distributions, uses AFO, no numbness in LLE, no saddle anesthesia, no b/b incontinence. Also pain in b/l shoulders with exacerbation of right shoulder pain, left elbow, left wrist, right hip, RLE. Extensive pathology on MRI L-spine, some also on MRI T-spine. Norco 10/325mg helped pain, worked better at QID dosing but still a little inadequate. Switched to Duragesic 25mcg/hr q3d, felt was inadequate,  tried using 2 and didn't like how it made her feel, too strong, but has breakthrough pain most days. Added Norco 10/325mg qdaily prn, then BID, formerly adequte, not currently. Did not get Voltaren. Pain inhibiting daily activities a little more. Symptoms essentially unchanged since last visit except worsening R shoulder pain, responded instantly to cream. Falling, with headaches, no LOC, new R hip pain, inpt with visual and auditory hallucinations, current meds continued, short refill on [place] by Psych. May be  moving into NH shortly.        The following portions of the patient's history were reviewed and updated as appropriate: allergies, current medications, past family history, past medical history, past social history, past surgical history and problem list.    Review of Systems   Constitutional: Positive for fatigue. Negative for chills and fever.   HENT: Negative for hearing loss and trouble swallowing.    Eyes: Negative for visual disturbance.   Respiratory: Positive for shortness of breath.    Cardiovascular: Positive for chest pain.   Gastrointestinal: Negative for abdominal pain, constipation, diarrhea, nausea and vomiting.   Genitourinary: Negative for urinary incontinence.   Musculoskeletal: Positive for arthralgias, back pain, joint swelling and neck pain. Negative for myalgias.   Neurological: Positive for weakness. Negative for dizziness, numbness and headache.       Objective   Physical Exam   Constitutional: She is oriented to person, place, and time. She appears well-developed and well-nourished.   HENT:   Head: Normocephalic and atraumatic.   Eyes: Pupils are equal, round, and reactive to light.   Cardiovascular: Normal rate, regular rhythm and normal heart sounds.   Pulmonary/Chest: Breath sounds normal.   Abdominal: Soft. Bowel sounds are normal. She exhibits no distension. There is no abdominal tenderness.   Musculoskeletal:      Comments: R foot drop   Neurological: She is alert and oriented to person, place, and time. She has normal reflexes. She displays normal reflexes. No sensory deficit.   Psychiatric: Her behavior is normal. Thought content normal.         Assessment & Plan   Diagnoses and all orders for this visit:    1. Chronic bilateral low back pain with right-sided sciatica (Primary)    2. Neck pain    3. Degeneration of lumbar or lumbosacral intervertebral disc    4. Hip pain, right    5. Chronic pain of both knees    6. Lumbar radiculopathy    7. Other cervical disc degeneration,  unspecified cervical region    8. Other intervertebral disc degeneration, thoracic region    9. Other long term (current) drug therapy    10. Chronic pain of both ankles    11. Pain in joint involving multiple sites    12. Chronic pain of both shoulders    13. Postlaminectomy syndrome, lumbar region    14. Spinal stenosis of cervical region    15. Spinal stenosis of thoracic region    16. Spinal stenosis of lumbar region, unspecified whether neurogenic claudication present    17. Osteoarthritis of spine with radiculopathy, lumbosacral region    18. Chronic bilateral thoracic back pain        INspect reviewed, in order. Low risk. UDS 6/16/21 in order.   Cont Duragesic 25mcg/hr q3d, Norco 10/325mg q12h prn breakthrough pain. O2 94%, no SOA. Hallucinations with Oxycodone.  Cont Voltaren gel for hand and shoulder.  No Gabapentin with falls risk and somnolence.  Cont other meds as prescribed.  X-ray R hip to r/o new pathology in 2017 with no acute issues, has mod OA and GTB.  Strongly encouraged patient to use rolling walker whenever up, use seat when holding still, begin aquatherapy if possible, already has HH nurse coming by. Discussed having HH PT, she will consider it.  Stopped RxAlt #1 cream, helped but had a reaction. Stopped Voltaren, works well but caused swelling. Begin compounded cream F from [facility].  RTC 3 months for f/u.

## 2022-09-16 DIAGNOSIS — M54.41 CHRONIC BILATERAL LOW BACK PAIN WITH RIGHT-SIDED SCIATICA: ICD-10-CM

## 2022-09-16 DIAGNOSIS — G89.29 CHRONIC BILATERAL LOW BACK PAIN WITH RIGHT-SIDED SCIATICA: ICD-10-CM

## 2022-09-16 RX ORDER — FENTANYL 25 UG/H
1 PATCH TRANSDERMAL
Qty: 10 PATCH | Refills: 0 | Status: SHIPPED | OUTPATIENT
Start: 2022-09-16 | End: 2022-10-10 | Stop reason: SDUPTHER

## 2022-09-16 RX ORDER — HYDROCODONE BITARTRATE AND ACETAMINOPHEN 10; 325 MG/1; MG/1
TABLET ORAL
Qty: 90 TABLET | Refills: 0 | Status: SHIPPED | OUTPATIENT
Start: 2022-09-16 | End: 2022-10-10 | Stop reason: SDUPTHER

## 2022-09-16 NOTE — TELEPHONE ENCOUNTER
Rx Refill Note  Requested Prescriptions     Pending Prescriptions Disp Refills   • fentaNYL (DURAGESIC) 25 MCG/HR patch 10 patch 0     Sig: Place 1 patch on the skin as directed by provider Every 72 (Seventy-Two) Hours.   • HYDROcodone-acetaminophen (Norco)  MG per tablet 90 tablet 0     Sig: Give 1 tablet by mouth at 5am, 2pm, 8pm.      Last office visit with prescribing clinician: 7/18/2022      Next office visit with prescribing clinician: 10/10/2022            Stefania Vance MA  09/16/22, 14:52 EDT

## 2022-10-10 ENCOUNTER — OFFICE VISIT (OUTPATIENT)
Dept: PAIN MEDICINE | Facility: CLINIC | Age: 82
End: 2022-10-10

## 2022-10-10 VITALS
DIASTOLIC BLOOD PRESSURE: 92 MMHG | RESPIRATION RATE: 16 BRPM | HEART RATE: 79 BPM | OXYGEN SATURATION: 95 % | SYSTOLIC BLOOD PRESSURE: 161 MMHG

## 2022-10-10 DIAGNOSIS — M50.30 OTHER CERVICAL DISC DEGENERATION, UNSPECIFIED CERVICAL REGION: ICD-10-CM

## 2022-10-10 DIAGNOSIS — M51.34 OTHER INTERVERTEBRAL DISC DEGENERATION, THORACIC REGION: ICD-10-CM

## 2022-10-10 DIAGNOSIS — M25.562 CHRONIC PAIN OF BOTH KNEES: ICD-10-CM

## 2022-10-10 DIAGNOSIS — M25.50 PAIN IN JOINT INVOLVING MULTIPLE SITES: ICD-10-CM

## 2022-10-10 DIAGNOSIS — M48.04 SPINAL STENOSIS OF THORACIC REGION: ICD-10-CM

## 2022-10-10 DIAGNOSIS — M48.02 SPINAL STENOSIS OF CERVICAL REGION: ICD-10-CM

## 2022-10-10 DIAGNOSIS — M51.37 DEGENERATION OF LUMBAR OR LUMBOSACRAL INTERVERTEBRAL DISC: ICD-10-CM

## 2022-10-10 DIAGNOSIS — M96.1 POSTLAMINECTOMY SYNDROME, LUMBAR REGION: ICD-10-CM

## 2022-10-10 DIAGNOSIS — G89.29 CHRONIC BILATERAL LOW BACK PAIN WITH RIGHT-SIDED SCIATICA: Primary | ICD-10-CM

## 2022-10-10 DIAGNOSIS — G89.29 CHRONIC PAIN OF BOTH KNEES: ICD-10-CM

## 2022-10-10 DIAGNOSIS — M47.27 OSTEOARTHRITIS OF SPINE WITH RADICULOPATHY, LUMBOSACRAL REGION: ICD-10-CM

## 2022-10-10 DIAGNOSIS — M25.561 CHRONIC PAIN OF BOTH KNEES: ICD-10-CM

## 2022-10-10 DIAGNOSIS — M54.2 NECK PAIN: ICD-10-CM

## 2022-10-10 DIAGNOSIS — G89.29 CHRONIC BILATERAL THORACIC BACK PAIN: ICD-10-CM

## 2022-10-10 DIAGNOSIS — Z79.899 OTHER LONG TERM (CURRENT) DRUG THERAPY: ICD-10-CM

## 2022-10-10 DIAGNOSIS — M54.6 CHRONIC BILATERAL THORACIC BACK PAIN: ICD-10-CM

## 2022-10-10 DIAGNOSIS — M48.061 SPINAL STENOSIS OF LUMBAR REGION, UNSPECIFIED WHETHER NEUROGENIC CLAUDICATION PRESENT: ICD-10-CM

## 2022-10-10 DIAGNOSIS — M54.41 CHRONIC BILATERAL LOW BACK PAIN WITH RIGHT-SIDED SCIATICA: Primary | ICD-10-CM

## 2022-10-10 PROCEDURE — 99213 OFFICE O/P EST LOW 20 MIN: CPT | Performed by: PHYSICAL MEDICINE & REHABILITATION

## 2022-10-10 PROCEDURE — G0463 HOSPITAL OUTPT CLINIC VISIT: HCPCS | Performed by: PHYSICAL MEDICINE & REHABILITATION

## 2022-10-10 RX ORDER — HYDROCODONE BITARTRATE AND ACETAMINOPHEN 10; 325 MG/1; MG/1
TABLET ORAL
Qty: 90 TABLET | Refills: 0 | Status: SHIPPED | OUTPATIENT
Start: 2022-10-10

## 2022-10-10 RX ORDER — FENTANYL 25 UG/H
1 PATCH TRANSDERMAL
Qty: 10 PATCH | Refills: 0 | Status: SHIPPED | OUTPATIENT
Start: 2022-10-10

## 2022-10-10 RX ORDER — GABAPENTIN 250 MG/5ML
SOLUTION ORAL 3 TIMES DAILY
COMMUNITY

## 2022-10-10 NOTE — PROGRESS NOTES
Subjective   Anamika Oliver is a 82 y.o. female.     History of Present Illness  All over pain. Worst pain is LBP, began in 1974 with fall, has numerous falls from right foot drop, pain is aching, sharp, stinging, stabbing, nonradiating, worse with all activities, ambulation, housework especially mopping and sweeping, improves with rest and laying down, improves with meds. Pain prevents housework, ambulation for any distance (uses RW), lower extremity dressing, meds help with all these.  Started Tramadol 50mg with almost no benefit, increased gabapentin to 800mg TID with improvement and improvement in mood, had taken Lortab 10/325 qdaily in the past with more benefit, started Norco 5/325 qdaily then BID, then QID, in past tried Fentanyl patch with good benefit. Was prescribed Butrans patch but couldn't afford it. Tried PT for 3-4 months several times with not much benefit, though aquatherapy helped. Tried LESIs without much help. S/p lumbar surgery x 2, no further surgery planned. Has right foot drop with numbness beginning at knee in all distributions, uses AFO, no numbness in LLE, no saddle anesthesia, no b/b incontinence. Also pain in b/l shoulders with exacerbation of right shoulder pain, left elbow, left wrist, right hip, RLE. Extensive pathology on MRI L-spine, some also on MRI T-spine. Norco 10/325mg helped pain, worked better at QID dosing but still a little inadequate. Switched to Duragesic 25mcg/hr q3d, felt was inadequate,  tried using 2 and didn't like how it made her feel, too strong, but has breakthrough pain most days. Added Norco 10/325mg qdaily prn, then BID, formerly adequte, not currently. Did not get Voltaren. Pain inhibiting daily activities a little more. Symptoms essentially unchanged since last visit except worsening R shoulder pain, responded instantly to cream. Falling, with headaches, no LOC, new R hip pain, inpt with visual and auditory hallucinations, current meds continued, short  refill on [place] by Psych. May be moving into NH shortly.        The following portions of the patient's history were reviewed and updated as appropriate: allergies, current medications, past family history, past medical history, past social history, past surgical history and problem list.    Review of Systems   Constitutional: Positive for fatigue. Negative for chills and fever.   HENT: Negative for hearing loss and trouble swallowing.    Eyes: Negative for visual disturbance.   Respiratory: Positive for shortness of breath.    Cardiovascular: Positive for chest pain.   Gastrointestinal: Negative for abdominal pain, constipation, diarrhea, nausea and vomiting.   Genitourinary: Negative for urinary incontinence.   Musculoskeletal: Positive for arthralgias, back pain, joint swelling and neck pain. Negative for myalgias.   Neurological: Positive for weakness. Negative for dizziness, numbness and headache.       Objective   Physical Exam   Constitutional: She is oriented to person, place, and time. She appears well-developed and well-nourished.   HENT:   Head: Normocephalic and atraumatic.   Eyes: Pupils are equal, round, and reactive to light.   Cardiovascular: Normal rate, regular rhythm and normal heart sounds.   Pulmonary/Chest: Breath sounds normal.   Abdominal: Soft. Bowel sounds are normal. She exhibits no distension. There is no abdominal tenderness.   Musculoskeletal:      Comments: R foot drop   Neurological: She is alert and oriented to person, place, and time. She has normal reflexes. She displays normal reflexes. No sensory deficit.   Psychiatric: Her behavior is normal. Thought content normal.         Assessment & Plan   Diagnoses and all orders for this visit:    1. Chronic bilateral low back pain with right-sided sciatica (Primary)    2. Degeneration of lumbar or lumbosacral intervertebral disc    3. Chronic pain of both knees    4. Neck pain    5. Other cervical disc degeneration, unspecified cervical  region    6. Other intervertebral disc degeneration, thoracic region    7. Other long term (current) drug therapy    8. Pain in joint involving multiple sites    9. Postlaminectomy syndrome, lumbar region    10. Spinal stenosis of cervical region    11. Spinal stenosis of thoracic region    12. Spinal stenosis of lumbar region, unspecified whether neurogenic claudication present    13. Osteoarthritis of spine with radiculopathy, lumbosacral region    14. Chronic bilateral thoracic back pain        INspect reviewed, in order. Low risk. Oral drug screen 7/9/22 in order.   Cont Duragesic 25mcg/hr q3d, Norco 10/325mg q8h prn breakthrough pain. O2 95%, no SOA. Hallucinations with Oxycodone.  Cont Voltaren gel for hand and shoulder.  No Gabapentin with falls risk and somnolence.  Cont other meds as prescribed.  X-ray R hip to r/o new pathology in 2017 with no acute issues, has mod OA and GTB.  Strongly encouraged patient to use rolling walker whenever up, use seat when holding still, begin aquatherapy if possible, already has HH nurse coming by. Discussed having HH PT, she will consider it.  Stopped RxAlt #1 cream, helped but had a reaction. Stopped Voltaren, works well but caused swelling. Begin compounded cream F from [place].  May need R knee injection, s/p L TKA.  RTC 3 months for f/u.

## 2022-11-16 ENCOUNTER — ON CAMPUS - OUTPATIENT (AMBULATORY)
Dept: URBAN - METROPOLITAN AREA HOSPITAL 2 | Facility: HOSPITAL | Age: 82
End: 2022-11-16
Payer: MEDICAID

## 2022-11-16 VITALS
DIASTOLIC BLOOD PRESSURE: 95 MMHG | SYSTOLIC BLOOD PRESSURE: 144 MMHG | WEIGHT: 216 LBS | OXYGEN SATURATION: 99 % | HEART RATE: 73 BPM | HEART RATE: 75 BPM | TEMPERATURE: 97.4 F | DIASTOLIC BLOOD PRESSURE: 84 MMHG | SYSTOLIC BLOOD PRESSURE: 155 MMHG | SYSTOLIC BLOOD PRESSURE: 156 MMHG | HEART RATE: 78 BPM | HEIGHT: 64 IN | HEART RATE: 76 BPM | DIASTOLIC BLOOD PRESSURE: 82 MMHG | OXYGEN SATURATION: 98 % | SYSTOLIC BLOOD PRESSURE: 134 MMHG | OXYGEN SATURATION: 96 % | HEART RATE: 82 BPM | DIASTOLIC BLOOD PRESSURE: 97 MMHG | DIASTOLIC BLOOD PRESSURE: 98 MMHG | RESPIRATION RATE: 18 BRPM | SYSTOLIC BLOOD PRESSURE: 168 MMHG | RESPIRATION RATE: 17 BRPM | RESPIRATION RATE: 16 BRPM | HEART RATE: 85 BPM | OXYGEN SATURATION: 100 % | SYSTOLIC BLOOD PRESSURE: 152 MMHG | DIASTOLIC BLOOD PRESSURE: 65 MMHG

## 2022-11-16 DIAGNOSIS — K44.9 DIAPHRAGMATIC HERNIA WITHOUT OBSTRUCTION OR GANGRENE: ICD-10-CM

## 2022-11-16 DIAGNOSIS — R13.10 DYSPHAGIA, UNSPECIFIED: ICD-10-CM

## 2022-11-16 DIAGNOSIS — K31.7 POLYP OF STOMACH AND DUODENUM: ICD-10-CM

## 2022-11-16 DIAGNOSIS — K22.2 ESOPHAGEAL OBSTRUCTION: ICD-10-CM

## 2022-11-16 PROCEDURE — 43248 EGD GUIDE WIRE INSERTION: CPT | Performed by: INTERNAL MEDICINE

## 2023-01-16 ENCOUNTER — OFFICE VISIT (OUTPATIENT)
Dept: PAIN MEDICINE | Facility: CLINIC | Age: 83
End: 2023-01-16
Payer: MEDICARE

## 2023-01-16 VITALS
SYSTOLIC BLOOD PRESSURE: 148 MMHG | DIASTOLIC BLOOD PRESSURE: 83 MMHG | RESPIRATION RATE: 16 BRPM | OXYGEN SATURATION: 98 % | HEART RATE: 70 BPM

## 2023-01-16 DIAGNOSIS — M47.27 OSTEOARTHRITIS OF SPINE WITH RADICULOPATHY, LUMBOSACRAL REGION: ICD-10-CM

## 2023-01-16 DIAGNOSIS — G89.29 CHRONIC PAIN OF BOTH KNEES: ICD-10-CM

## 2023-01-16 DIAGNOSIS — M48.061 SPINAL STENOSIS OF LUMBAR REGION, UNSPECIFIED WHETHER NEUROGENIC CLAUDICATION PRESENT: ICD-10-CM

## 2023-01-16 DIAGNOSIS — M54.16 LUMBAR RADICULOPATHY: ICD-10-CM

## 2023-01-16 DIAGNOSIS — G89.29 CHRONIC BILATERAL LOW BACK PAIN WITH RIGHT-SIDED SCIATICA: Primary | ICD-10-CM

## 2023-01-16 DIAGNOSIS — M25.551 HIP PAIN, RIGHT: ICD-10-CM

## 2023-01-16 DIAGNOSIS — M48.02 SPINAL STENOSIS OF CERVICAL REGION: ICD-10-CM

## 2023-01-16 DIAGNOSIS — G89.29 CHRONIC BILATERAL THORACIC BACK PAIN: ICD-10-CM

## 2023-01-16 DIAGNOSIS — M48.04 SPINAL STENOSIS OF THORACIC REGION: ICD-10-CM

## 2023-01-16 DIAGNOSIS — M25.571 CHRONIC PAIN OF BOTH ANKLES: ICD-10-CM

## 2023-01-16 DIAGNOSIS — M54.41 CHRONIC BILATERAL LOW BACK PAIN WITH RIGHT-SIDED SCIATICA: Primary | ICD-10-CM

## 2023-01-16 DIAGNOSIS — M25.50 PAIN IN JOINT INVOLVING MULTIPLE SITES: ICD-10-CM

## 2023-01-16 DIAGNOSIS — Z79.899 OTHER LONG TERM (CURRENT) DRUG THERAPY: ICD-10-CM

## 2023-01-16 DIAGNOSIS — M25.561 CHRONIC PAIN OF BOTH KNEES: ICD-10-CM

## 2023-01-16 DIAGNOSIS — M25.572 CHRONIC PAIN OF BOTH ANKLES: ICD-10-CM

## 2023-01-16 DIAGNOSIS — M51.34 OTHER INTERVERTEBRAL DISC DEGENERATION, THORACIC REGION: ICD-10-CM

## 2023-01-16 DIAGNOSIS — G89.29 CHRONIC PAIN OF BOTH ANKLES: ICD-10-CM

## 2023-01-16 DIAGNOSIS — M25.562 CHRONIC PAIN OF BOTH KNEES: ICD-10-CM

## 2023-01-16 DIAGNOSIS — M54.6 CHRONIC BILATERAL THORACIC BACK PAIN: ICD-10-CM

## 2023-01-16 DIAGNOSIS — G89.29 CHRONIC PAIN OF BOTH SHOULDERS: ICD-10-CM

## 2023-01-16 DIAGNOSIS — M54.2 NECK PAIN: ICD-10-CM

## 2023-01-16 DIAGNOSIS — M96.1 POSTLAMINECTOMY SYNDROME, LUMBAR REGION: ICD-10-CM

## 2023-01-16 DIAGNOSIS — M51.37 DEGENERATION OF LUMBAR OR LUMBOSACRAL INTERVERTEBRAL DISC: ICD-10-CM

## 2023-01-16 DIAGNOSIS — M25.511 CHRONIC PAIN OF BOTH SHOULDERS: ICD-10-CM

## 2023-01-16 DIAGNOSIS — M50.30 OTHER CERVICAL DISC DEGENERATION, UNSPECIFIED CERVICAL REGION: ICD-10-CM

## 2023-01-16 DIAGNOSIS — M25.512 CHRONIC PAIN OF BOTH SHOULDERS: ICD-10-CM

## 2023-01-16 PROCEDURE — G0463 HOSPITAL OUTPT CLINIC VISIT: HCPCS | Performed by: PHYSICAL MEDICINE & REHABILITATION

## 2023-01-16 PROCEDURE — 99212 OFFICE O/P EST SF 10 MIN: CPT | Performed by: PHYSICAL MEDICINE & REHABILITATION

## 2023-01-16 RX ORDER — PENTOXIFYLLINE 400 MG/1
400 TABLET, EXTENDED RELEASE ORAL
COMMUNITY

## 2023-01-16 NOTE — PROGRESS NOTES
Subjective   Anamika Oliver is a 82 y.o. female.     History of Present Illness  All over pain. Worst pain is LBP, began in 1974 with fall, has numerous falls from right foot drop, pain is aching, sharp, stinging, stabbing, nonradiating, worse with all activities, ambulation, housework especially mopping and sweeping, improves with rest and laying down, improves with meds. Pain prevents housework, ambulation for any distance (uses RW), lower extremity dressing, meds help with all these.  Started Tramadol 50mg with almost no benefit, increased gabapentin to 800mg TID with improvement and improvement in mood, had taken Lortab 10/325 qdaily in the past with more benefit, started Norco 5/325 qdaily then BID, then QID, in past tried Fentanyl patch with good benefit. Was prescribed Butrans patch but couldn't afford it. Tried PT for 3-4 months several times with not much benefit, though aquatherapy helped. Tried LESIs without much help. S/p lumbar surgery x 2, no further surgery planned. Has right foot drop with numbness beginning at knee in all distributions, uses AFO, no numbness in LLE, no saddle anesthesia, no b/b incontinence. Also pain in b/l shoulders with exacerbation of right shoulder pain, left elbow, left wrist, right hip, RLE. Extensive pathology on MRI L-spine, some also on MRI T-spine. Norco 10/325mg helped pain, worked better at QID dosing but still a little inadequate. Switched to Duragesic 25mcg/hr q3d, felt was inadequate,  tried using 2 and didn't like how it made her feel, too strong, but has breakthrough pain most days. Added Norco 10/325mg qdaily prn, then BID, formerly adequte, not currently. Did not get Voltaren. Pain inhibiting daily activities a little more. Symptoms essentially unchanged since last visit except worsening R shoulder pain, responded instantly to cream. Falling, with headaches, no LOC, new R hip pain, inpt with visual and auditory hallucinations, current meds continued, short  refill on [place] by Psych. May be moving into NH shortly.        The following portions of the patient's history were reviewed and updated as appropriate: allergies, current medications, past family history, past medical history, past social history, past surgical history and problem list.    Review of Systems   Constitutional: Positive for fatigue. Negative for chills and fever.   HENT: Negative for hearing loss and trouble swallowing.    Eyes: Negative for visual disturbance.   Respiratory: Positive for shortness of breath.    Cardiovascular: Positive for chest pain.   Gastrointestinal: Negative for abdominal pain, constipation, diarrhea, nausea and vomiting.   Genitourinary: Negative for urinary incontinence.   Musculoskeletal: Positive for arthralgias, back pain, joint swelling and neck pain. Negative for myalgias.   Neurological: Positive for weakness. Negative for dizziness, numbness and headache.       Objective   Physical Exam   Constitutional: She is oriented to person, place, and time. She appears well-developed and well-nourished.   HENT:   Head: Normocephalic and atraumatic.   Eyes: Pupils are equal, round, and reactive to light.   Cardiovascular: Normal rate, regular rhythm and normal heart sounds.   Pulmonary/Chest: Breath sounds normal.   Abdominal: Soft. Bowel sounds are normal. She exhibits no distension. There is no abdominal tenderness.   Musculoskeletal:      Comments: R foot drop   Neurological: She is alert and oriented to person, place, and time. She has normal reflexes. She displays normal reflexes. No sensory deficit.   Psychiatric: Her behavior is normal. Thought content normal.         Assessment & Plan   Diagnoses and all orders for this visit:    1. Chronic bilateral low back pain with right-sided sciatica (Primary)    2. Degeneration of lumbar or lumbosacral intervertebral disc    3. Hip pain, right    4. Chronic pain of both knees    5. Lumbar radiculopathy    6. Neck pain    7. Other  cervical disc degeneration, unspecified cervical region    8. Other intervertebral disc degeneration, thoracic region    9. Other long term (current) drug therapy    10. Chronic pain of both ankles    11. Pain in joint involving multiple sites    12. Chronic pain of both shoulders    13. Postlaminectomy syndrome, lumbar region    14. Spinal stenosis of cervical region    15. Spinal stenosis of thoracic region    16. Spinal stenosis of lumbar region, unspecified whether neurogenic claudication present    17. Osteoarthritis of spine with radiculopathy, lumbosacral region    18. Chronic bilateral thoracic back pain        INspect reviewed, in order. Low risk. Oral drug screen 7/9/22 in order.   Cont Duragesic 25mcg/hr q3d, Norco 10/325mg q8h prn breakthrough pain. O2 98%, no SOA, being prescribed by NH MD. Hallucinations with Oxycodone.  Cont Voltaren gel for hand and shoulder.  No Gabapentin with falls risk and somnolence.  Cont other meds as prescribed.  X-ray R hip to r/o new pathology in 2017 with no acute issues, has mod OA and GTB.  Strongly encouraged patient to use rolling walker whenever up, use seat when holding still, begin aquatherapy if possible, already has HH nurse coming by. Discussed having HH PT, she will consider it.  Stopped RxAlt #1 cream, helped but had a reaction. Stopped Voltaren, works well but caused swelling. Begin compounded cream F from [place].  May need R knee injection, s/p L TKA, she will call to schedule if NH MD is confident she does not have an infection in RLE.  RTC prn.

## 2023-05-27 ENCOUNTER — HOSPITAL ENCOUNTER (EMERGENCY)
Facility: HOSPITAL | Age: 83
Discharge: HOME | End: 2023-05-27
Attending: EMERGENCY MEDICINE | Admitting: EMERGENCY MEDICINE

## 2023-05-27 VITALS
OXYGEN SATURATION: 96 % | SYSTOLIC BLOOD PRESSURE: 159 MMHG | DIASTOLIC BLOOD PRESSURE: 68 MMHG | BODY MASS INDEX: 34.66 KG/M2 | WEIGHT: 208 LBS | TEMPERATURE: 97.8 F | HEIGHT: 65 IN | HEART RATE: 62 BPM | RESPIRATION RATE: 14 BRPM

## 2023-05-27 DIAGNOSIS — M79.671 PAIN IN BOTH FEET: Primary | ICD-10-CM

## 2023-05-27 DIAGNOSIS — I87.8 VENOUS STASIS: ICD-10-CM

## 2023-05-27 DIAGNOSIS — M79.672 PAIN IN BOTH FEET: Primary | ICD-10-CM

## 2023-05-27 LAB
ANION GAP SERPL CALCULATED.3IONS-SCNC: 9 MMOL/L (ref 5–15)
BASOPHILS # BLD AUTO: 0 10*3/MM3 (ref 0–0.2)
BASOPHILS NFR BLD AUTO: 0.4 % (ref 0–1.5)
BUN SERPL-MCNC: 15 MG/DL (ref 8–23)
BUN/CREAT SERPL: 18.5 (ref 7–25)
CALCIUM SPEC-SCNC: 9.3 MG/DL (ref 8.6–10.5)
CHLORIDE SERPL-SCNC: 106 MMOL/L (ref 98–107)
CO2 SERPL-SCNC: 28 MMOL/L (ref 22–29)
CREAT SERPL-MCNC: 0.81 MG/DL (ref 0.57–1)
D DIMER PPP FEU-MCNC: 0.38 MG/L (FEU) (ref 0–0.84)
DEPRECATED RDW RBC AUTO: 48.6 FL (ref 37–54)
EGFRCR SERPLBLD CKD-EPI 2021: 71.7 ML/MIN/1.73
EOSINOPHIL # BLD AUTO: 0.1 10*3/MM3 (ref 0–0.4)
EOSINOPHIL NFR BLD AUTO: 1.7 % (ref 0.3–6.2)
ERYTHROCYTE [DISTWIDTH] IN BLOOD BY AUTOMATED COUNT: 13.4 % (ref 12.3–15.4)
GLUCOSE SERPL-MCNC: 119 MG/DL (ref 65–99)
HCT VFR BLD AUTO: 42.1 % (ref 34–46.6)
HGB BLD-MCNC: 14 G/DL (ref 12–15.9)
LYMPHOCYTES # BLD AUTO: 2.4 10*3/MM3 (ref 0.7–3.1)
LYMPHOCYTES NFR BLD AUTO: 43.2 % (ref 19.6–45.3)
MAGNESIUM SERPL-MCNC: 1.6 MG/DL (ref 1.6–2.4)
MCH RBC QN AUTO: 32.6 PG (ref 26.6–33)
MCHC RBC AUTO-ENTMCNC: 33.2 G/DL (ref 31.5–35.7)
MCV RBC AUTO: 98.2 FL (ref 79–97)
MONOCYTES # BLD AUTO: 0.5 10*3/MM3 (ref 0.1–0.9)
MONOCYTES NFR BLD AUTO: 8.9 % (ref 5–12)
NEUTROPHILS NFR BLD AUTO: 2.5 10*3/MM3 (ref 1.7–7)
NEUTROPHILS NFR BLD AUTO: 45.8 % (ref 42.7–76)
NRBC BLD AUTO-RTO: 0.2 /100 WBC (ref 0–0.2)
PLATELET # BLD AUTO: 177 10*3/MM3 (ref 140–450)
PMV BLD AUTO: 8.7 FL (ref 6–12)
POTASSIUM SERPL-SCNC: 4 MMOL/L (ref 3.5–5.2)
RBC # BLD AUTO: 4.28 10*6/MM3 (ref 3.77–5.28)
SODIUM SERPL-SCNC: 143 MMOL/L (ref 136–145)
WBC NRBC COR # BLD: 5.6 10*3/MM3 (ref 3.4–10.8)

## 2023-05-27 PROCEDURE — 99283 EMERGENCY DEPT VISIT LOW MDM: CPT

## 2023-05-27 PROCEDURE — 85379 FIBRIN DEGRADATION QUANT: CPT | Performed by: EMERGENCY MEDICINE

## 2023-05-27 PROCEDURE — 85025 COMPLETE CBC W/AUTO DIFF WBC: CPT | Performed by: EMERGENCY MEDICINE

## 2023-05-27 PROCEDURE — 80048 BASIC METABOLIC PNL TOTAL CA: CPT | Performed by: EMERGENCY MEDICINE

## 2023-05-27 PROCEDURE — 83735 ASSAY OF MAGNESIUM: CPT | Performed by: EMERGENCY MEDICINE

## 2023-05-27 RX ORDER — SODIUM CHLORIDE 0.9 % (FLUSH) 0.9 %
10 SYRINGE (ML) INJECTION AS NEEDED
Status: DISCONTINUED | OUTPATIENT
Start: 2023-05-27 | End: 2023-05-28 | Stop reason: HOSPADM

## 2023-05-28 NOTE — ED PROVIDER NOTES
Subjective   History of Present Illness  83-year-old female was sent from the extended care facility with reports of foot pain and discoloration.  Patient reports a history of chronic foot pain and neuropathy in the extremity.  She reports no acute trauma no fevers or chills.  Review of Systems  She reports no shortness of breath or chest or abdominal pain or any new numbness or weakness  Past Medical History:   Diagnosis Date   • Actinic keratosis    • Anxiety and depression    • CAD (coronary artery disease)    • CPAP (continuous positive airway pressure) dependence    • Cystocele with rectocele    • DDD (degenerative disc disease), lumbar    • Dementia     EARLY STAGES    • Diverticulosis    • Edema    • Fatigue    • Fibrocystic disease of breast    • GERD (gastroesophageal reflux disease)     With PUD. Pt had bleeding in 7-13 that req hosp with ransfusions, hgb 6.8 prior to admission   • HTN (hypertension)    • Hyperlipidemia    • Hypothyroidism    • Immobility    • Iron deficiency anemia     Due to blood loss   • Knee pain    • Low back pain    • Melanotic stools    • Osteoarthritis    • Schatzki's ring    • Scoliosis    • Seborrheic keratosis    • Ulnar tunnel syndrome    • Ulnar tunnel syndrome    • Wheeze        Allergies   Allergen Reactions   • Clopidogrel Bisulfate Other (See Comments)   • Nifedipine Unknown (See Comments)   • Penicillins Unknown (See Comments)   • Dust Mite Extract Other (See Comments)       Past Surgical History:   Procedure Laterality Date   • CARDIAC CATHETERIZATION  2004    Stents in 2010   • CHOLECYSTECTOMY     • COLONOSCOPY  07/24/2013    Diverticulosis   • ENDOSCOPY      Late 60's PUD & HH & dysphagia. Admitted in 7[facility] to [facility] with PUD with hgb down to 6.8   • GALLBLADDER SURGERY     • HYSTERECTOMY     • LUMBAR DECOMPRESSION  1980    L4-L5     • OTHER SURGICAL HISTORY N/A 03/22/2022    medtronic sacral neuromodulation by dr duarte   • REPLACEMENT TOTAL KNEE Left         Family History   Problem Relation Age of Onset   • Heart disease Mother    • Heart disease Father    • Heart disease Daughter    • Heart disease Son    • Diabetes Maternal Uncle    • Diabetes Maternal Grandfather        Social History     Socioeconomic History   • Marital status:    Tobacco Use   • Smoking status: Never     Passive exposure: Never   • Smokeless tobacco: Never   Vaping Use   • Vaping Use: Never used   Substance and Sexual Activity   • Alcohol use: No   • Drug use: No     Prior to Admission medications    Medication Sig Start Date End Date Taking? Authorizing Provider   acetaminophen (TYLENOL) 325 MG tablet  2/5/22   Lydia Thibodeaux MD   ATENOLOL PO 75 mg. 5/7/13   Lydia Thibodeaux MD   atorvastatin (LIPITOR) 40 MG tablet Take 40 mg by mouth Daily. 8/27/14   Lydia Thibodeaux MD   Calcium 600-200 MG-UNIT per tablet CALCIUM 600-200 MG-UNIT TABS 8/20/13   Lydia Thibodeaux MD   cholecalciferol (VITAMIN D3) 25 MCG (1000 UT) tablet Take 1,000 Units by mouth Daily.    Lydia Thibodeaux MD   cyanocobalamin 1000 MCG/ML injection  2/11/22   Lydia Thibodeaux MD   Diclofenac Sodium (VOLTAREN) 1 % gel gel Apply  topically to the appropriate area as directed 4 (Four) Times a Day As Needed (pain). 3/7/22   Abhijeet Agustin MD   fentaNYL (DURAGESIC) 25 MCG/HR patch Place 1 patch on the skin as directed by provider Every 72 (Seventy-Two) Hours. 10/10/22   Abhijeet Agustin MD   fentaNYL (DURAGESIC) 25 MCG/HR patch Place 1 patch on the skin as directed by provider Every 72 (Seventy-Two) Hours. 10/10/22   Abhijeet Agustin MD   fentaNYL (DURAGESIC) 25 MCG/HR patch Place 1 patch on the skin as directed by provider Every 72 (Seventy-Two) Hours. 10/10/22   Abhijeet Agsutin MD   fexofenadine (ALLEGRA) 60 MG tablet Take 60 mg by mouth Daily.    Lydia Thibodeaux MD   furosemide (LASIX) 40 MG tablet Take 40 mg by mouth Daily. 7/6/18   Lydia Thibodeaux MD  "  gabapentin (NEURONTIN) 50 mg/mL solution Take  by mouth 3 (Three) Times a Day.    Lydia Thibodeaux MD   HYDROcodone-acetaminophen (Norco)  MG per tablet Give 1 tablet by mouth at 5am, 2pm, 8pm. 10/10/22   Abhijeet Agustin MD   HYDROcodone-acetaminophen (Norco)  MG per tablet Give 1 tablet by mouth at 5am, 2pm, 8pm. 10/10/22   Abhijeet Agustin MD   HYDROcodone-acetaminophen (NORCO)  MG per tablet Give 1 tablet by mouth at 5am, 2pm, 8pm. 10/10/22   Abhijeet Agustin MD   levothyroxine (SYNTHROID, LEVOTHROID) 75 MCG tablet Take 75 mcg by mouth Daily. 5/23/13   Lydia Thibodeaux MD   losartan (COZAAR) 100 MG tablet Take 100 mg by mouth Daily. 4/19/17   Lydia Thibodeaux MD   Mirabegron ER (MYRBETRIQ) 25 MG tablet sustained-release 24 hour 24 hr tablet Take 25 mg by mouth Daily.    Lydia Thibodeaux MD   omeprazole (priLOSEC) 40 MG capsule  2/14/22   Lydia Thibodeaux MD   pentoxifylline (TRENtal) 400 MG CR tablet Take 400 mg by mouth 3 (Three) Times a Day With Meals.    Lydia Thibodeaux MD   polyethylene glycol (MIRALAX) 17 GM/SCOOP powder  2/19/22   Lydia Thibodeaux MD   potassium chloride (K-DUR,KLOR-CON) 20 MEQ CR tablet Take 20 mEq by mouth 2 (Two) Times a Day. 8/20/13   Lydia Thibodeaux MD   Trelegy Ellipta 100-62.5-25 MCG/INH inhaler  2/11/22   Lydia Thibodeaux MD   venlafaxine (EFFEXOR) 150 MG tablet sustained-release 24 hour 24 hr tablet Take 150 mg by mouth Daily. 5/13/14   Lydia Thibodeaux MD     /68   Pulse 62   Temp 97.8 °F (36.6 °C)   Resp 14   Ht 163.8 cm (64.5\")   Wt 94.3 kg (208 lb)   SpO2 96%   BMI 35.15 kg/m²         Objective   Physical Exam  General: Obese elderly female no acute distress, awake alert and pleasant  Eyes:  sclera nonicteric  HEENT: Mucous membranes moist, no mucosal swelling  Neck: Supple, no nuchal rigidity,   Respirations: Respirations nonlabored, equal breath sounds bilaterally, clear " lungs  Heart regular rate and rhythm,   Abdomen soft nontender nondistended,   Extremities some chronic appearing edema in the bilateral lower extremities right greater than left, some distal purplish hue of the toes bilaterally right greater than left, normal dorsalis pedis and posterior tibial pulses in the feet, calves and thighs nontender, no erythema or localized fever in the feet or legs  Neuro cranial nerves grossly intact, no focal limb deficits, generally deconditioned  Psych oriented, pleasant affect  Skin no rash, brisk cap refill  Procedures           ED Course           Results for orders placed or performed during the hospital encounter of 05/27/23   Basic Metabolic Panel    Specimen: Blood   Result Value Ref Range    Glucose 119 (H) 65 - 99 mg/dL    BUN 15 8 - 23 mg/dL    Creatinine 0.81 0.57 - 1.00 mg/dL    Sodium 143 136 - 145 mmol/L    Potassium 4.0 3.5 - 5.2 mmol/L    Chloride 106 98 - 107 mmol/L    CO2 28.0 22.0 - 29.0 mmol/L    Calcium 9.3 8.6 - 10.5 mg/dL    BUN/Creatinine Ratio 18.5 7.0 - 25.0    Anion Gap 9.0 5.0 - 15.0 mmol/L    eGFR 71.7 >60.0 mL/min/1.73   D-dimer, Quantitative    Specimen: Blood   Result Value Ref Range    D-Dimer, Quantitative 0.38 0.00 - 0.84 mg/L (FEU)   Magnesium    Specimen: Blood   Result Value Ref Range    Magnesium 1.6 1.6 - 2.4 mg/dL   CBC Auto Differential    Specimen: Blood   Result Value Ref Range    WBC 5.60 3.40 - 10.80 10*3/mm3    RBC 4.28 3.77 - 5.28 10*6/mm3    Hemoglobin 14.0 12.0 - 15.9 g/dL    Hematocrit 42.1 34.0 - 46.6 %    MCV 98.2 (H) 79.0 - 97.0 fL    MCH 32.6 26.6 - 33.0 pg    MCHC 33.2 31.5 - 35.7 g/dL    RDW 13.4 12.3 - 15.4 %    RDW-SD 48.6 37.0 - 54.0 fl    MPV 8.7 6.0 - 12.0 fL    Platelets 177 140 - 450 10*3/mm3    Neutrophil % 45.8 42.7 - 76.0 %    Lymphocyte % 43.2 19.6 - 45.3 %    Monocyte % 8.9 5.0 - 12.0 %    Eosinophil % 1.7 0.3 - 6.2 %    Basophil % 0.4 0.0 - 1.5 %    Neutrophils, Absolute 2.50 1.70 - 7.00 10*3/mm3    Lymphocytes,  Absolute 2.40 0.70 - 3.10 10*3/mm3    Monocytes, Absolute 0.50 0.10 - 0.90 10*3/mm3    Eosinophils, Absolute 0.10 0.00 - 0.40 10*3/mm3    Basophils, Absolute 0.00 0.00 - 0.20 10*3/mm3    nRBC 0.2 0.0 - 0.2 /100 WBC                                     Medical Decision Making  Patient presents with some acute on chronic foot pain and chronic swelling.  Differential diagnosis including ischemia, DVT, cellulitis    Patient has no signs of ischemia she has good pulses, I suspect some chronic venous stasis.  In discussion with the daughter at the bedside states she does have ongoing chronic diagnosis of chronic venous stasis and this is unchanged.  She also has a history of chronic neuropathy.  We discussed that there is no signs of infection on exam or on the blood work today.  Also D-dimer screen was negative, DVT felt to be unlikely.  She will be discharged back to the facility.  We discussed some supportive care measures such as compression stockings and elevation.  They are given warning signs for return.    Pain in both feet: complicated acute illness or injury  Venous stasis: complicated acute illness or injury  Amount and/or Complexity of Data Reviewed  Labs: ordered. Decision-making details documented in ED Course.     Details: CBC normal, Chem-7 essentially normal, magnesium and D-dimer normal      Risk  Prescription drug management.          Final diagnoses:   Pain in both feet   Venous stasis       ED Disposition  ED Disposition     ED Disposition   Discharge    Condition   Stable    Comment   --             No follow-up provider specified.       Medication List      No changes were made to your prescriptions during this visit.          Laci Ocampo MD  05/27/23 2051

## 2023-05-28 NOTE — DISCHARGE INSTRUCTIONS
Ongoing follow-up with your primary care for your chronic foot pain.  Consider compression stockings, elevate extremities to limit swelling.  Return for increased pain, fever, or any other concerns

## 2023-06-07 ENCOUNTER — HOSPITAL ENCOUNTER (OUTPATIENT)
Dept: GENERAL RADIOLOGY | Facility: HOSPITAL | Age: 83
Discharge: HOME | End: 2023-06-07
Payer: MEDICARE

## 2023-06-07 ENCOUNTER — OFFICE VISIT (OUTPATIENT)
Dept: PAIN MEDICINE | Facility: CLINIC | Age: 83
End: 2023-06-07
Payer: MEDICARE

## 2023-06-07 VITALS — RESPIRATION RATE: 16 BRPM | OXYGEN SATURATION: 97 % | HEART RATE: 70 BPM

## 2023-06-07 DIAGNOSIS — G89.29 CHRONIC BILATERAL THORACIC BACK PAIN: ICD-10-CM

## 2023-06-07 DIAGNOSIS — M48.02 SPINAL STENOSIS OF CERVICAL REGION: ICD-10-CM

## 2023-06-07 DIAGNOSIS — M25.572 CHRONIC PAIN OF BOTH ANKLES: ICD-10-CM

## 2023-06-07 DIAGNOSIS — M25.562 CHRONIC PAIN OF BOTH KNEES: ICD-10-CM

## 2023-06-07 DIAGNOSIS — M48.061 SPINAL STENOSIS OF LUMBAR REGION, UNSPECIFIED WHETHER NEUROGENIC CLAUDICATION PRESENT: ICD-10-CM

## 2023-06-07 DIAGNOSIS — M54.41 CHRONIC BILATERAL LOW BACK PAIN WITH RIGHT-SIDED SCIATICA: Primary | ICD-10-CM

## 2023-06-07 DIAGNOSIS — M25.512 CHRONIC PAIN OF BOTH SHOULDERS: ICD-10-CM

## 2023-06-07 DIAGNOSIS — M25.511 CHRONIC PAIN OF BOTH SHOULDERS: ICD-10-CM

## 2023-06-07 DIAGNOSIS — G89.29 CHRONIC PAIN OF BOTH KNEES: ICD-10-CM

## 2023-06-07 DIAGNOSIS — M54.16 LUMBAR RADICULOPATHY: ICD-10-CM

## 2023-06-07 DIAGNOSIS — M47.27 OSTEOARTHRITIS OF SPINE WITH RADICULOPATHY, LUMBOSACRAL REGION: ICD-10-CM

## 2023-06-07 DIAGNOSIS — M54.2 NECK PAIN: ICD-10-CM

## 2023-06-07 DIAGNOSIS — M54.41 CHRONIC BILATERAL LOW BACK PAIN WITH RIGHT-SIDED SCIATICA: ICD-10-CM

## 2023-06-07 DIAGNOSIS — M25.551 HIP PAIN, RIGHT: ICD-10-CM

## 2023-06-07 DIAGNOSIS — M54.6 CHRONIC BILATERAL THORACIC BACK PAIN: ICD-10-CM

## 2023-06-07 DIAGNOSIS — M25.561 CHRONIC PAIN OF BOTH KNEES: ICD-10-CM

## 2023-06-07 DIAGNOSIS — G89.29 CHRONIC PAIN OF BOTH SHOULDERS: ICD-10-CM

## 2023-06-07 DIAGNOSIS — M25.571 CHRONIC PAIN OF BOTH ANKLES: ICD-10-CM

## 2023-06-07 DIAGNOSIS — M48.04 SPINAL STENOSIS OF THORACIC REGION: ICD-10-CM

## 2023-06-07 DIAGNOSIS — G89.29 CHRONIC PAIN OF BOTH ANKLES: ICD-10-CM

## 2023-06-07 DIAGNOSIS — G89.29 CHRONIC BILATERAL LOW BACK PAIN WITH RIGHT-SIDED SCIATICA: ICD-10-CM

## 2023-06-07 DIAGNOSIS — M51.34 OTHER INTERVERTEBRAL DISC DEGENERATION, THORACIC REGION: ICD-10-CM

## 2023-06-07 DIAGNOSIS — M50.30 OTHER CERVICAL DISC DEGENERATION, UNSPECIFIED CERVICAL REGION: ICD-10-CM

## 2023-06-07 DIAGNOSIS — M51.37 DEGENERATION OF LUMBAR OR LUMBOSACRAL INTERVERTEBRAL DISC: ICD-10-CM

## 2023-06-07 DIAGNOSIS — M25.50 PAIN IN JOINT INVOLVING MULTIPLE SITES: ICD-10-CM

## 2023-06-07 DIAGNOSIS — M96.1 POSTLAMINECTOMY SYNDROME, LUMBAR REGION: ICD-10-CM

## 2023-06-07 DIAGNOSIS — G89.29 CHRONIC BILATERAL LOW BACK PAIN WITH RIGHT-SIDED SCIATICA: Primary | ICD-10-CM

## 2023-06-07 PROCEDURE — 73501 X-RAY EXAM HIP UNI 1 VIEW: CPT

## 2023-06-07 PROCEDURE — 73610 X-RAY EXAM OF ANKLE: CPT

## 2023-06-07 PROCEDURE — 72100 X-RAY EXAM L-S SPINE 2/3 VWS: CPT

## 2023-06-07 PROCEDURE — 73630 X-RAY EXAM OF FOOT: CPT

## 2023-06-07 PROCEDURE — G0463 HOSPITAL OUTPT CLINIC VISIT: HCPCS | Performed by: PHYSICAL MEDICINE & REHABILITATION

## 2023-06-07 PROCEDURE — 73560 X-RAY EXAM OF KNEE 1 OR 2: CPT

## 2023-06-07 RX ORDER — CLINDAMYCIN HYDROCHLORIDE 300 MG/1
CAPSULE ORAL
COMMUNITY
Start: 2023-06-05

## 2023-06-07 NOTE — PROGRESS NOTES
Subjective   Anamika Oliver is a 83 y.o. female.     History of Present Illness  All over pain. Worst pain is LBP, began in 1974 with fall, has numerous falls from right foot drop, pain is aching, sharp, stinging, stabbing, nonradiating, worse with all activities, ambulation, housework especially mopping and sweeping, improves with rest and laying down, improves with meds. Pain prevents housework, ambulation for any distance (uses RW), lower extremity dressing, meds help with all these.  Started Tramadol 50mg with almost no benefit, increased gabapentin to 800mg TID with improvement and improvement in mood, had taken Lortab 10/325 qdaily in the past with more benefit, started Norco 5/325 qdaily then BID, then QID, in past tried Fentanyl patch with good benefit. Was prescribed Butrans patch but couldn't afford it. Tried PT for 3-4 months several times with not much benefit, though aquatherapy helped. Tried LESIs without much help. S/p lumbar surgery x 2, no further surgery planned. Has right foot drop with numbness beginning at knee in all distributions, uses AFO, no numbness in LLE, no saddle anesthesia, no b/b incontinence. Also pain in b/l shoulders with exacerbation of right shoulder pain, left elbow, left wrist, right hip, RLE. Extensive pathology on MRI L-spine, some also on MRI T-spine. Norco 10/325mg helped pain, worked better at QID dosing but still a little inadequate. Switched to Duragesic 25mcg/hr q3d, felt was inadequate,  tried using 2 and didn't like how it made her feel, too strong, but has breakthrough pain most days. Added Norco 10/325mg qdaily prn, then BID, formerly adequte, not currently. Did not get Voltaren. Pain inhibiting daily activities a little more. Symptoms essentially unchanged since last visit except worsening R shoulder pain, responded instantly to cream. Falling, with headaches, no LOC, new R hip pain, inpt with visual and auditory hallucinations, current meds continued, short  refill on [place] by Psych. May be moving into NH shortly.      The following portions of the patient's history were reviewed and updated as appropriate: allergies, current medications, past family history, past medical history, past social history, past surgical history and problem list.    Review of Systems   Constitutional:  Positive for fatigue. Negative for chills and fever.   HENT:  Negative for hearing loss and trouble swallowing.    Eyes:  Negative for visual disturbance.   Respiratory:  Positive for shortness of breath.    Cardiovascular:  Positive for chest pain.   Gastrointestinal:  Negative for abdominal pain, constipation, diarrhea, nausea and vomiting.   Genitourinary:  Negative for urinary incontinence.   Musculoskeletal:  Positive for arthralgias, back pain, joint swelling and neck pain. Negative for myalgias.   Neurological:  Positive for weakness. Negative for dizziness, numbness and headache.     Objective   Physical Exam   Constitutional: She is oriented to person, place, and time. She appears well-developed and well-nourished.   HENT:   Head: Normocephalic and atraumatic.   Eyes: Pupils are equal, round, and reactive to light.   Cardiovascular: Normal rate, regular rhythm and normal heart sounds.   Pulmonary/Chest: Breath sounds normal.   Abdominal: Soft. Bowel sounds are normal. She exhibits no distension. There is no abdominal tenderness.   Musculoskeletal:      Comments: R foot drop   Neurological: She is alert and oriented to person, place, and time. She has normal reflexes. She displays normal reflexes. No sensory deficit.   Psychiatric: Her behavior is normal. Thought content normal.       Assessment & Plan   Diagnoses and all orders for this visit:    1. Chronic bilateral low back pain with right-sided sciatica (Primary)    2. Degeneration of lumbar or lumbosacral intervertebral disc    3. Hip pain, right    4. Chronic pain of both knees    5. Lumbar radiculopathy    6. Neck pain    7.  Other cervical disc degeneration, unspecified cervical region    8. Other intervertebral disc degeneration, thoracic region    9. Pain in joint involving multiple sites    10. Chronic pain of both ankles    11. Chronic pain of both shoulders    12. Postlaminectomy syndrome, lumbar region    13. Spinal stenosis of cervical region    14. Spinal stenosis of thoracic region    15. Spinal stenosis of lumbar region, unspecified whether neurogenic claudication present    16. Osteoarthritis of spine with radiculopathy, lumbosacral region    17. Chronic bilateral thoracic back pain        INspect reviewed, in order. Low risk. Oral drug screen 7/9/22 in order.   Cont Duragesic 25mcg/hr q3d, Norco 10/325mg q8h prn breakthrough pain. O2 97%, no SOA, being prescribed by NH MD. Hallucinations with Oxycodone.  Cont Voltaren gel for hand and shoulder.  No Gabapentin with falls risk and somnolence.  Cont other meds as prescribed.  X-ray R hip to r/o new pathology in 2017 with no acute issues, has mod OA and GTB. Had fall, new X-ray R foot, ankle, knee, hip, L-spine  Strongly encouraged patient to use rolling walker whenever up, use seat when holding still, begin aquatherapy if possible, already has HH nurse coming by. Discussed having HH PT, she will consider it.  Stopped RxAlt #1 cream, helped but had a reaction. Stopped Voltaren, works well but caused swelling. Begin compounded cream F from [place].  May need R knee injection, s/p L TKA, she will call to schedule if NH MD is confident she does not have an infection in RLE.  RTC prn.

## 2024-12-03 ENCOUNTER — OFFICE VISIT (OUTPATIENT)
Dept: ORTHOPEDIC SURGERY | Facility: CLINIC | Age: 84
End: 2024-12-03
Payer: MEDICARE

## 2024-12-03 VITALS — HEIGHT: 65 IN | WEIGHT: 208 LBS | BODY MASS INDEX: 34.66 KG/M2 | OXYGEN SATURATION: 100 %

## 2024-12-03 DIAGNOSIS — S42.212D CLOSED DISPLACED FRACTURE OF SURGICAL NECK OF LEFT HUMERUS WITH ROUTINE HEALING, UNSPECIFIED FRACTURE MORPHOLOGY, SUBSEQUENT ENCOUNTER: Primary | ICD-10-CM

## 2024-12-03 RX ORDER — METOPROLOL TARTRATE 50 MG
50 TABLET ORAL 2 TIMES DAILY
COMMUNITY

## 2024-12-03 RX ORDER — CEPHALEXIN 500 MG/1
500 CAPSULE ORAL 3 TIMES DAILY
COMMUNITY

## 2024-12-03 NOTE — PROGRESS NOTES
"Anamika is a 84 y.o. year old female presents to [facility]    Chief Complaint   Patient presents with    Initial Evaluation     Left humerus fx - fell 11/17/2024       History of Present Illness  Anamika Oliver is a 84 y.o. female [place] [facility] [facility] presents to clinic daughter, Amiee, and transporter, Rosangela,  for presents to the clinic for evaluation of left shoulder/arm pain secondary to humerus fracture due to a fall hitting her head on bedroom sink and hitting shoulder on 11/17/2024  Hydrocodone 10/325 for pain.       I have reviewed the patient's medical, family, and social history in detail and updated the computerized patient record.    Objective:  Ht 163.8 cm (64.5\")   Wt 94.3 kg (208 lb)   SpO2 100%   BMI 35.15 kg/m²      Physical Exam    Vital signs reviewed.   General: No acute distress.  Eyes: conjunctiva clear  ENT: external ears atraumatic  CV: no peripheral edema  Resp: normal respiratory effort  Skin: no rashes or wounds; normal turgor  Psych: mood and affect appropriate; recent and remote memory intact  Neuro: sensation to light touch intact    MSK Exam      Left shoulder:   Effusion. Large area of ecchymosis over anterior aspect and bicep (approximately 8 cm)  Range of motion at the wrist and digits grossly intact  Mild to moderate edema over the forearm and hand wrist  Sensation light touch intact in all distributions  Radial pulse palpable capillary refill less than 2 seconds all digits    Imaging:  XR Shoulder 2+ View Left (12/03/2024 14:54)       Assessment:  Diagnoses and all orders for this visit:    Closed displaced fracture of surgical neck of left humerus with routine healing, unspecified fracture morphology, subsequent encounter  -     XR Shoulder 2+ View Left    Other orders  -     Cancel: XR Humerus Left  -     metoprolol tartrate (LOPRESSOR) 50 MG tablet; Take 1 tablet by mouth 2 (Two) Times a Day.  -     cephalexin (KEFLEX) 500 " MG capsule; Take 1 capsule by mouth 3 (Three) Times a Day.    Plan: Surgical (reverse TSA) versus conservative treatment options were discussed extensively today, with a brief consultation with Dr. Garcia, and patient wishes to proceed with conservative.  Recommend maintaining sling in place.  No motion to the shoulder.  Range of motion to the elbow and wrist 3-5 times daily.  Nonweightbearing to the left shoulder/upper extremity.  Continue with PCP prescribed Hydrocodone and icing every 2 hours for at least 20 minutes for pain. Follow-up in 3 to 4 weeks for repeat x-ray.      Class 2 Severe Obesity (BMI >=35 and <=39.9). Obesity-related health conditions include the following: obstructive sleep apnea, hypertension, GERD, and osteoarthritis. Obesity is newly identified. BMI is is above average; BMI management plan is completed. We discussed low calorie, low carb based diet program and portion control.      Follow Up   Return in about 23 days (around 12/26/2024) for Recheck, repeat xray.  Patient was given instructions and counseling regarding her condition or for health maintenance advice. Please see specific information pulled into the AVS if appropriate.     EMR Dragon/Transcription disclaimer:    Much of this encounter note is an electronic transcription/translation of spoken language to printed text.  The electronic translation of spoken language may permit erroneous, or at times, nonsensical words or phrases to be inadvertently transcribed.  Although I have reviewed the note for such errors some may still exist.

## 2024-12-23 ENCOUNTER — OFFICE VISIT (OUTPATIENT)
Dept: ORTHOPEDIC SURGERY | Facility: CLINIC | Age: 84
End: 2024-12-23
Payer: MEDICARE

## 2024-12-23 VITALS — HEIGHT: 64 IN | BODY MASS INDEX: 35.51 KG/M2 | OXYGEN SATURATION: 96 % | HEART RATE: 61 BPM | WEIGHT: 208 LBS

## 2024-12-23 DIAGNOSIS — S42.212D CLOSED DISPLACED FRACTURE OF SURGICAL NECK OF LEFT HUMERUS WITH ROUTINE HEALING, UNSPECIFIED FRACTURE MORPHOLOGY, SUBSEQUENT ENCOUNTER: Primary | ICD-10-CM

## 2024-12-23 RX ORDER — CETIRIZINE HYDROCHLORIDE 10 MG/1
TABLET ORAL
COMMUNITY
Start: 2024-12-21

## 2024-12-23 RX ORDER — UMECLIDINIUM BROMIDE AND VILANTEROL TRIFENATATE 62.5; 25 UG/1; UG/1
POWDER RESPIRATORY (INHALATION)
COMMUNITY
Start: 2024-12-21

## 2024-12-23 RX ORDER — FLUTICASONE PROPIONATE 50 MCG
SPRAY, SUSPENSION (ML) NASAL
COMMUNITY
Start: 2024-12-07

## 2024-12-23 RX ORDER — PRAZOSIN HYDROCHLORIDE 1 MG/1
CAPSULE ORAL
COMMUNITY
Start: 2024-12-09

## 2024-12-23 NOTE — PROGRESS NOTES
"   Patient ID: Anamika Oliver is a 84 y.o. female [facility] [place] [facility] resident presents with her daughter, Aimee, and transporter, Rosangela , for further follow up on left humeral fracture. Reports pain well controlled. Reports PT at home has been working with her, mostly standing.     Objective:  Pulse 61   Ht 162.6 cm (64\")   Wt 94.3 kg (208 lb)   SpO2 96%   BMI 35.70 kg/m²     Physical Examination:     Left shoulder:  Sling removed for exam  Ecchymosis over the anterior aspect of distal bicep  Tenderness to palpation about mid-bicep  Lacking approximately 5-8 degrees of extension at the elbow, flexion lacking 5 degrees  ROM at the digits and wrist mostly intact  Radial pulse palpable and capillary refill less than 2 seconds to all digits       Imaging:   XR Shoulder 2+ View Left (12/23/2024 14:51)       Assessment:    Diagnoses and all orders for this visit:    1. Closed displaced fracture of surgical neck of left humerus with routine healing, unspecified fracture morphology, subsequent encounter (Primary)  -     XR Shoulder 2+ View Left    Plan: Discontinue the sling; Passive ROM (forward flexion up to 75 degrres, and abduction 45-55 degrees; external rotation 20-40 degrees);   Follow up in 4 weeks for repeat xray and further evaluation.  All questions answered.      Disclaimer: Part of this note may be an electronic transcription/translation of spoken language to printed text using the Dragon Dictation System  "

## 2024-12-26 ENCOUNTER — TELEPHONE (OUTPATIENT)
Dept: ORTHOPEDIC SURGERY | Facility: CLINIC | Age: 84
End: 2024-12-26
Payer: MEDICARE

## 2024-12-26 NOTE — TELEPHONE ENCOUNTER
Caller: FRANCIS    Relationship: [facility]    Best call back number: [phone]    What was the call regarding: PT'S NURSING FACILITY CALLING TO GET VERBAL ORDERS FOR PT'S L ARM AFTER APPT ON 12/23/24. PT'S NURSING FACILITY STATES THAT THE PT RELAYED SHE IS NOT REQUIRED TO USE THE ARM SLING ANYMORE AND THAT PT'S PHYSICAL THERAPIST CAN START DOING RANGE OF MOTION WITH THE L ARM. PT'S PHYSICAL THERAPIST NEEDS VERBAL ORDERS FROM PROVIDER TO CONFIRM. PLEASE CONTACT PT'S NURSING FACILITY ABOVE TO RELAY VERBAL ORDERS.

## 2025-01-27 ENCOUNTER — OFFICE VISIT (OUTPATIENT)
Dept: ORTHOPEDIC SURGERY | Facility: CLINIC | Age: 85
End: 2025-01-27
Payer: MEDICARE

## 2025-01-27 VITALS — HEIGHT: 64 IN | BODY MASS INDEX: 35.51 KG/M2 | WEIGHT: 208 LBS | RESPIRATION RATE: 18 BRPM

## 2025-01-27 DIAGNOSIS — S42.212D CLOSED DISPLACED FRACTURE OF SURGICAL NECK OF LEFT HUMERUS WITH ROUTINE HEALING, UNSPECIFIED FRACTURE MORPHOLOGY, SUBSEQUENT ENCOUNTER: Primary | ICD-10-CM

## 2025-01-27 NOTE — PROGRESS NOTES
"   Patient ID: Anamika Oliver is a 84 y.o. female [place] [facility] [place] resident presents with her daughter, Aimee, and transporter, Rosangela , for further follow up on left humeral neck fracture from 11/17/2024. Continues to take hydrocodone 10/325 for generalized pain, no longer taking patches.     Objective:  Resp 18   Ht 162.6 cm (64\")   Wt 94.3 kg (208 lb)   BMI 35.70 kg/m²     Physical Examination:  Easily confused     Left upper arm/shoulder:.  Slight effusion. Atrophy. No ecchymosis.  Tenderness over the anterior joint line and lateral aspect  Passive forward flexion 90 with pain and stiffness, abduction 75 with mild pain and stiffness  Range of motion at the elbow, wrist and digits grossly intact  Radial pulse palpable capillary refill less than 3 seconds to all digits      Imaging:   XR Shoulder 2+ View Left (01/27/2025 14:22)     Assessment:    Diagnoses and all orders for this visit:    1. Closed displaced fracture of surgical neck of left humerus with routine healing, unspecified fracture morphology, subsequent encounter (Primary)  -     XR Shoulder 2+ View Left    Plan: No lifting over 1-2 lbs. may begin /continue light passive stretching with forward flexion to 100, and abduction to 90 as tolerated. Follow up in 4 weeks for repeat xray and full clearance/lifting restrictions.  All questions answered.      Disclaimer: Part of this note may be an electronic transcription/translation of spoken language to printed text using the Dragon Dictation System  "